# Patient Record
Sex: FEMALE | Race: OTHER | Employment: FULL TIME | URBAN - METROPOLITAN AREA
[De-identification: names, ages, dates, MRNs, and addresses within clinical notes are randomized per-mention and may not be internally consistent; named-entity substitution may affect disease eponyms.]

---

## 2018-05-23 PROCEDURE — 81001 URINALYSIS AUTO W/SCOPE: CPT | Performed by: FAMILY MEDICINE

## 2022-05-09 ENCOUNTER — NURSE ONLY (OUTPATIENT)
Dept: INTERNAL MEDICINE CLINIC | Facility: HOSPITAL | Age: 32
End: 2022-05-09
Attending: EMERGENCY MEDICINE

## 2022-05-09 DIAGNOSIS — Z00.00 WELLNESS EXAMINATION: Primary | ICD-10-CM

## 2022-05-09 PROCEDURE — 86480 TB TEST CELL IMMUN MEASURE: CPT

## 2022-05-09 PROCEDURE — 86787 VARICELLA-ZOSTER ANTIBODY: CPT

## 2022-05-10 LAB
M TB IFN-G CD4+ T-CELLS BLD-ACNC: 0.07 IU/ML
M TB TUBERC IFN-G BLD QL: NEGATIVE
M TB TUBERC IGNF/MITOGEN IGNF CONTROL: >10 IU/ML
QFT TB1 AG MINUS NIL: -0.01 IU/ML
QFT TB2 AG MINUS NIL: 0 IU/ML

## 2022-05-11 LAB — VZV IGG SER IA-ACNC: 1390 (ref 165–?)

## 2022-06-18 ENCOUNTER — HOSPITAL ENCOUNTER (OUTPATIENT)
Dept: GENERAL RADIOLOGY | Age: 32
Discharge: HOME | End: 2022-06-18
Attending: PHYSICIAN ASSISTANT
Payer: COMMERCIAL

## 2022-06-18 ENCOUNTER — OFFICE VISIT (OUTPATIENT)
Dept: FAMILY MEDICINE CLINIC | Facility: CLINIC | Age: 32
End: 2022-06-18
Payer: COMMERCIAL

## 2022-06-18 VITALS
HEART RATE: 69 BPM | DIASTOLIC BLOOD PRESSURE: 83 MMHG | WEIGHT: 293 LBS | SYSTOLIC BLOOD PRESSURE: 117 MMHG | HEIGHT: 64 IN | BODY MASS INDEX: 50.02 KG/M2

## 2022-06-18 DIAGNOSIS — M25.9 LESION OF WRIST: ICD-10-CM

## 2022-06-18 DIAGNOSIS — M25.9 LESION OF WRIST: Primary | ICD-10-CM

## 2022-06-18 PROCEDURE — 3008F BODY MASS INDEX DOCD: CPT | Performed by: PHYSICIAN ASSISTANT

## 2022-06-18 PROCEDURE — 3079F DIAST BP 80-89 MM HG: CPT | Performed by: PHYSICIAN ASSISTANT

## 2022-06-18 PROCEDURE — 3074F SYST BP LT 130 MM HG: CPT | Performed by: PHYSICIAN ASSISTANT

## 2022-06-18 PROCEDURE — 99202 OFFICE O/P NEW SF 15 MIN: CPT | Performed by: PHYSICIAN ASSISTANT

## 2022-06-18 PROCEDURE — 73110 X-RAY EXAM OF WRIST: CPT | Performed by: PHYSICIAN ASSISTANT

## 2022-07-13 ENCOUNTER — TELEMEDICINE (OUTPATIENT)
Dept: INTERNAL MEDICINE CLINIC | Facility: CLINIC | Age: 32
End: 2022-07-13

## 2022-07-13 DIAGNOSIS — Z51.81 ENCOUNTER FOR THERAPEUTIC DRUG MONITORING: Primary | ICD-10-CM

## 2022-07-13 DIAGNOSIS — E66.01 CLASS 3 SEVERE OBESITY DUE TO EXCESS CALORIES WITHOUT SERIOUS COMORBIDITY WITH BODY MASS INDEX (BMI) OF 50.0 TO 59.9 IN ADULT (HCC): ICD-10-CM

## 2022-07-13 DIAGNOSIS — F50.89 OTHER DISORDER OF EATING: ICD-10-CM

## 2022-07-13 DIAGNOSIS — D50.9 IRON DEFICIENCY ANEMIA, UNSPECIFIED IRON DEFICIENCY ANEMIA TYPE: ICD-10-CM

## 2022-07-13 PROBLEM — E66.813 CLASS 3 SEVERE OBESITY DUE TO EXCESS CALORIES WITHOUT SERIOUS COMORBIDITY WITH BODY MASS INDEX (BMI) OF 50.0 TO 59.9 IN ADULT: Status: ACTIVE | Noted: 2022-07-13

## 2022-07-13 PROBLEM — E66.813 CLASS 3 SEVERE OBESITY DUE TO EXCESS CALORIES WITHOUT SERIOUS COMORBIDITY WITH BODY MASS INDEX (BMI) OF 50.0 TO 59.9 IN ADULT (HCC): Status: ACTIVE | Noted: 2022-07-13

## 2022-07-13 PROCEDURE — 99204 OFFICE O/P NEW MOD 45 MIN: CPT | Performed by: NURSE PRACTITIONER

## 2022-07-13 RX ORDER — LIRAGLUTIDE 6 MG/ML
3 INJECTION, SOLUTION SUBCUTANEOUS DAILY
Qty: 15 ML | Refills: 1 | Status: SHIPPED | OUTPATIENT
Start: 2022-07-13

## 2022-07-13 RX ORDER — PEN NEEDLE, DIABETIC 30 GX3/16"
1 NEEDLE, DISPOSABLE MISCELLANEOUS DAILY
Qty: 100 EACH | Refills: 1 | Status: SHIPPED | OUTPATIENT
Start: 2022-07-13

## 2022-07-21 ENCOUNTER — LAB ENCOUNTER (OUTPATIENT)
Dept: LAB | Facility: HOSPITAL | Age: 32
End: 2022-07-21
Attending: NURSE PRACTITIONER
Payer: COMMERCIAL

## 2022-07-21 DIAGNOSIS — F50.89 OTHER DISORDER OF EATING: ICD-10-CM

## 2022-07-21 DIAGNOSIS — Z51.81 ENCOUNTER FOR THERAPEUTIC DRUG MONITORING: ICD-10-CM

## 2022-07-21 DIAGNOSIS — E66.01 CLASS 3 SEVERE OBESITY DUE TO EXCESS CALORIES WITHOUT SERIOUS COMORBIDITY WITH BODY MASS INDEX (BMI) OF 50.0 TO 59.9 IN ADULT (HCC): ICD-10-CM

## 2022-07-21 DIAGNOSIS — D50.9 IRON DEFICIENCY ANEMIA, UNSPECIFIED IRON DEFICIENCY ANEMIA TYPE: ICD-10-CM

## 2022-07-21 LAB
EST. AVERAGE GLUCOSE BLD GHB EST-MCNC: 117 MG/DL (ref 68–126)
HBA1C MFR BLD: 5.7 % (ref ?–5.7)
VIT B12 SERPL-MCNC: 281 PG/ML (ref 193–986)
VIT D+METAB SERPL-MCNC: 14.9 NG/ML (ref 30–100)

## 2022-07-21 PROCEDURE — 83036 HEMOGLOBIN GLYCOSYLATED A1C: CPT

## 2022-07-21 PROCEDURE — 82306 VITAMIN D 25 HYDROXY: CPT

## 2022-07-21 PROCEDURE — 82607 VITAMIN B-12: CPT

## 2022-07-21 PROCEDURE — 36415 COLL VENOUS BLD VENIPUNCTURE: CPT

## 2022-08-26 ENCOUNTER — HOSPITAL ENCOUNTER (OUTPATIENT)
Dept: ULTRASOUND IMAGING | Facility: HOSPITAL | Age: 32
Discharge: HOME | End: 2022-08-26
Attending: PHYSICIAN ASSISTANT
Payer: COMMERCIAL

## 2022-08-26 DIAGNOSIS — M25.9 LESION OF WRIST: ICD-10-CM

## 2022-08-26 PROCEDURE — 76882 US LMTD JT/FCL EVL NVASC XTR: CPT | Performed by: PHYSICIAN ASSISTANT

## 2022-08-28 ENCOUNTER — LAB ENCOUNTER (OUTPATIENT)
Dept: LAB | Facility: HOSPITAL | Age: 32
End: 2022-08-28
Attending: PHYSICIAN ASSISTANT
Payer: COMMERCIAL

## 2022-08-28 DIAGNOSIS — Z00.00 ROUTINE GENERAL MEDICAL EXAMINATION AT A HEALTH CARE FACILITY: ICD-10-CM

## 2022-08-28 LAB
ALBUMIN SERPL-MCNC: 3.4 G/DL (ref 3.4–5)
ALBUMIN/GLOB SERPL: 0.9 {RATIO} (ref 1–2)
ALP LIVER SERPL-CCNC: 49 U/L
ALT SERPL-CCNC: 22 U/L
ANION GAP SERPL CALC-SCNC: 5 MMOL/L (ref 0–18)
AST SERPL-CCNC: 16 U/L (ref 15–37)
BASOPHILS # BLD AUTO: 0.05 X10(3) UL (ref 0–0.2)
BASOPHILS NFR BLD AUTO: 0.9 %
BILIRUB SERPL-MCNC: 0.5 MG/DL (ref 0.1–2)
BUN BLD-MCNC: 12 MG/DL (ref 7–18)
CALCIUM BLD-MCNC: 8.4 MG/DL (ref 8.5–10.1)
CHLORIDE SERPL-SCNC: 111 MMOL/L (ref 98–112)
CHOLEST SERPL-MCNC: 184 MG/DL (ref ?–200)
CO2 SERPL-SCNC: 23 MMOL/L (ref 21–32)
CREAT BLD-MCNC: 0.7 MG/DL
EOSINOPHIL # BLD AUTO: 0.16 X10(3) UL (ref 0–0.7)
EOSINOPHIL NFR BLD AUTO: 2.8 %
ERYTHROCYTE [DISTWIDTH] IN BLOOD BY AUTOMATED COUNT: 13.3 %
FASTING PATIENT LIPID ANSWER: YES
FASTING STATUS PATIENT QL REPORTED: YES
GFR SERPLBLD BASED ON 1.73 SQ M-ARVRAT: 119 ML/MIN/1.73M2 (ref 60–?)
GLOBULIN PLAS-MCNC: 3.7 G/DL (ref 2.8–4.4)
GLUCOSE BLD-MCNC: 94 MG/DL (ref 70–99)
HCT VFR BLD AUTO: 39.3 %
HDLC SERPL-MCNC: 53 MG/DL (ref 40–59)
HGB BLD-MCNC: 12.8 G/DL
IMM GRANULOCYTES # BLD AUTO: 0.01 X10(3) UL (ref 0–1)
IMM GRANULOCYTES NFR BLD: 0.2 %
LDLC SERPL CALC-MCNC: 117 MG/DL (ref ?–100)
LYMPHOCYTES # BLD AUTO: 1.96 X10(3) UL (ref 1–4)
LYMPHOCYTES NFR BLD AUTO: 34.3 %
MCH RBC QN AUTO: 29.7 PG (ref 26–34)
MCHC RBC AUTO-ENTMCNC: 32.6 G/DL (ref 31–37)
MCV RBC AUTO: 91.2 FL
MONOCYTES # BLD AUTO: 0.39 X10(3) UL (ref 0.1–1)
MONOCYTES NFR BLD AUTO: 6.8 %
NEUTROPHILS # BLD AUTO: 3.14 X10 (3) UL (ref 1.5–7.7)
NEUTROPHILS # BLD AUTO: 3.14 X10(3) UL (ref 1.5–7.7)
NEUTROPHILS NFR BLD AUTO: 55 %
NONHDLC SERPL-MCNC: 131 MG/DL (ref ?–130)
OSMOLALITY SERPL CALC.SUM OF ELEC: 288 MOSM/KG (ref 275–295)
PLATELET # BLD AUTO: 230 10(3)UL (ref 150–450)
POTASSIUM SERPL-SCNC: 4 MMOL/L (ref 3.5–5.1)
PROT SERPL-MCNC: 7.1 G/DL (ref 6.4–8.2)
RBC # BLD AUTO: 4.31 X10(6)UL
SODIUM SERPL-SCNC: 139 MMOL/L (ref 136–145)
TRIGL SERPL-MCNC: 75 MG/DL (ref 30–149)
TSI SER-ACNC: 1.09 MIU/ML (ref 0.36–3.74)
VLDLC SERPL CALC-MCNC: 13 MG/DL (ref 0–30)
WBC # BLD AUTO: 5.7 X10(3) UL (ref 4–11)

## 2022-08-28 PROCEDURE — 80061 LIPID PANEL: CPT

## 2022-08-28 PROCEDURE — 36415 COLL VENOUS BLD VENIPUNCTURE: CPT

## 2022-08-28 PROCEDURE — 85025 COMPLETE CBC W/AUTO DIFF WBC: CPT

## 2022-08-28 PROCEDURE — 84443 ASSAY THYROID STIM HORMONE: CPT

## 2022-08-28 PROCEDURE — 80053 COMPREHEN METABOLIC PANEL: CPT

## 2022-09-02 ENCOUNTER — OFFICE VISIT (OUTPATIENT)
Dept: FAMILY MEDICINE CLINIC | Facility: CLINIC | Age: 32
End: 2022-09-02
Payer: COMMERCIAL

## 2022-09-02 VITALS
WEIGHT: 287 LBS | DIASTOLIC BLOOD PRESSURE: 79 MMHG | HEIGHT: 64 IN | HEART RATE: 89 BPM | SYSTOLIC BLOOD PRESSURE: 121 MMHG | BODY MASS INDEX: 49 KG/M2

## 2022-09-02 DIAGNOSIS — Z00.00 ROUTINE GENERAL MEDICAL EXAMINATION AT A HEALTH CARE FACILITY: Primary | ICD-10-CM

## 2022-09-02 PROCEDURE — 3008F BODY MASS INDEX DOCD: CPT | Performed by: PHYSICIAN ASSISTANT

## 2022-09-02 PROCEDURE — 90471 IMMUNIZATION ADMIN: CPT | Performed by: PHYSICIAN ASSISTANT

## 2022-09-02 PROCEDURE — 3078F DIAST BP <80 MM HG: CPT | Performed by: PHYSICIAN ASSISTANT

## 2022-09-02 PROCEDURE — 90715 TDAP VACCINE 7 YRS/> IM: CPT | Performed by: PHYSICIAN ASSISTANT

## 2022-09-02 PROCEDURE — 3074F SYST BP LT 130 MM HG: CPT | Performed by: PHYSICIAN ASSISTANT

## 2022-09-02 PROCEDURE — 99395 PREV VISIT EST AGE 18-39: CPT | Performed by: PHYSICIAN ASSISTANT

## 2022-09-15 ENCOUNTER — OFFICE VISIT (OUTPATIENT)
Dept: INTERNAL MEDICINE CLINIC | Facility: CLINIC | Age: 32
End: 2022-09-15
Payer: COMMERCIAL

## 2022-09-15 VITALS
DIASTOLIC BLOOD PRESSURE: 80 MMHG | RESPIRATION RATE: 16 BRPM | HEART RATE: 78 BPM | BODY MASS INDEX: 48.4 KG/M2 | WEIGHT: 287 LBS | HEIGHT: 64.5 IN | SYSTOLIC BLOOD PRESSURE: 120 MMHG

## 2022-09-15 VITALS — WEIGHT: 289 LBS | BODY MASS INDEX: 49.34 KG/M2 | HEIGHT: 64 IN

## 2022-09-15 DIAGNOSIS — E55.9 VITAMIN D DEFICIENCY: ICD-10-CM

## 2022-09-15 DIAGNOSIS — E53.8 LOW VITAMIN B12 LEVEL: ICD-10-CM

## 2022-09-15 DIAGNOSIS — D50.9 IRON DEFICIENCY ANEMIA, UNSPECIFIED IRON DEFICIENCY ANEMIA TYPE: ICD-10-CM

## 2022-09-15 DIAGNOSIS — Z51.81 ENCOUNTER FOR THERAPEUTIC DRUG MONITORING: Primary | ICD-10-CM

## 2022-09-15 DIAGNOSIS — F50.89 OTHER DISORDER OF EATING: ICD-10-CM

## 2022-09-15 DIAGNOSIS — R73.03 PREDIABETES: ICD-10-CM

## 2022-09-15 DIAGNOSIS — E66.01 CLASS 3 SEVERE OBESITY DUE TO EXCESS CALORIES WITHOUT SERIOUS COMORBIDITY WITH BODY MASS INDEX (BMI) OF 50.0 TO 59.9 IN ADULT (HCC): ICD-10-CM

## 2022-09-15 PROCEDURE — 3008F BODY MASS INDEX DOCD: CPT

## 2022-09-15 PROCEDURE — 97802 MEDICAL NUTRITION INDIV IN: CPT

## 2022-09-15 RX ORDER — SEMAGLUTIDE 1.7 MG/.75ML
1.7 INJECTION, SOLUTION SUBCUTANEOUS WEEKLY
Qty: 3 ML | Refills: 0 | Status: SHIPPED | OUTPATIENT
Start: 2022-09-15 | End: 2022-10-07

## 2022-09-18 PROBLEM — R73.03 PREDIABETES: Status: ACTIVE | Noted: 2022-09-18

## 2022-10-13 ENCOUNTER — IMMUNIZATION (OUTPATIENT)
Dept: LAB | Facility: HOSPITAL | Age: 32
End: 2022-10-13
Attending: PREVENTIVE MEDICINE
Payer: COMMERCIAL

## 2022-10-13 DIAGNOSIS — Z23 NEED FOR VACCINATION: Primary | ICD-10-CM

## 2022-10-13 PROCEDURE — 90471 IMMUNIZATION ADMIN: CPT

## 2022-10-19 DIAGNOSIS — E66.01 CLASS 3 SEVERE OBESITY DUE TO EXCESS CALORIES WITHOUT SERIOUS COMORBIDITY WITH BODY MASS INDEX (BMI) OF 50.0 TO 59.9 IN ADULT (HCC): ICD-10-CM

## 2022-10-19 DIAGNOSIS — R73.03 PREDIABETES: ICD-10-CM

## 2022-10-19 DIAGNOSIS — Z51.81 ENCOUNTER FOR THERAPEUTIC DRUG MONITORING: ICD-10-CM

## 2022-10-19 RX ORDER — SEMAGLUTIDE 1.7 MG/.75ML
INJECTION, SOLUTION SUBCUTANEOUS
Qty: 3 ML | Refills: 0 | OUTPATIENT
Start: 2022-10-19

## 2022-10-19 NOTE — TELEPHONE ENCOUNTER
Requesting Wegovy  LOV: 9/15/22  RTC: 2 months  Last Relevant Labs: na  Filled: 9/15/22 #3ml with 0 refills    Future Appointments   Date Time Provider Rebeka Neff   11/10/2022 11:00 AM [facility]   12/9/2022 10:00 AM Grayson Dc RD [facility]

## 2022-12-09 ENCOUNTER — OFFICE VISIT (OUTPATIENT)
Dept: INTERNAL MEDICINE CLINIC | Facility: CLINIC | Age: 32
End: 2022-12-09
Payer: COMMERCIAL

## 2022-12-09 ENCOUNTER — TELEMEDICINE (OUTPATIENT)
Dept: INTERNAL MEDICINE CLINIC | Facility: CLINIC | Age: 32
End: 2022-12-09

## 2022-12-09 VITALS — HEIGHT: 64.5 IN | BODY MASS INDEX: 45.6 KG/M2 | WEIGHT: 270.38 LBS

## 2022-12-09 DIAGNOSIS — E66.01 CLASS 3 SEVERE OBESITY DUE TO EXCESS CALORIES WITHOUT SERIOUS COMORBIDITY WITH BODY MASS INDEX (BMI) OF 50.0 TO 59.9 IN ADULT (HCC): ICD-10-CM

## 2022-12-09 DIAGNOSIS — E53.8 LOW VITAMIN B12 LEVEL: ICD-10-CM

## 2022-12-09 DIAGNOSIS — R73.03 PREDIABETES: ICD-10-CM

## 2022-12-09 DIAGNOSIS — Z51.81 ENCOUNTER FOR THERAPEUTIC DRUG MONITORING: Primary | ICD-10-CM

## 2022-12-09 DIAGNOSIS — E55.9 VITAMIN D DEFICIENCY: ICD-10-CM

## 2022-12-09 PROCEDURE — 97803 MED NUTRITION INDIV SUBSEQ: CPT

## 2022-12-09 PROCEDURE — 3008F BODY MASS INDEX DOCD: CPT

## 2022-12-09 PROCEDURE — 99213 OFFICE O/P EST LOW 20 MIN: CPT | Performed by: NURSE PRACTITIONER

## 2022-12-09 NOTE — PATIENT INSTRUCTIONS
Goals:  Continue to keep a food record, My Net Diary, select macros dashboard. Continue to consume 4-6 meals/snacks per day. Include protein and produce. Aim for 60-80 grams per day of protein. Try to keep the carbohydrates at 120 grams per day or less. (Combine protein when eating sweets.)  Continue to drink 64 oz per day. Continue to exercise daily. Add strength training 10 minutes three days per week.   (Try ankle or wrist weights when walking or biking with dog every other day) or (7 minute workout) 125

## 2022-12-09 NOTE — PATIENT INSTRUCTIONS
Next steps:  1. Fill your prescribed medication and take as discussed and prescribed: wegovy 2.4mg  2. Schedule a personal nutrition consultation with one of our registered dieticians     Please try to work on the following dietary changes:  Daily protein recommendation to start: 100  grams  Daily carbohydrate: <140g  Daily calories: 1,700  1. Drink water with meals and throughout the day, cut down on soda and/or juice if consumed. Consider flavored water options like Bubbly, Spindrift, Hint and Shante. 2.  Eat breakfast daily and focus on having protein with each meal, examples include: greek yogurt, cottage cheese, hard boiled egg, whole grain toast with peanut butter. 3.  Reduce refined carbohydrates and sugars which includes items such as sweets, as well as rice, pasta, and bread and make sure to choose whole grain options when having them with just 1 serving per meal about the size of your inner palm. 4.  Consume non starchy veggies daily working towards making them a good 50% of your daily food intake. Add them to lunch and dinner consistently. 5.  Start a daily probiotic: VSL#3 is recommended, (order on line at www.vsl3. com). Take 1 capsule daily with water for 30 days, then reduce to 1 every other day (this will reduce the cost). Capsules can be left out for 2 weeks, but then must be refrigerated. Please download meg My Fitness Brandy Miles! Or Net Diary to monitor daily dietary intake and you will be able to see if you are eating the right amount of calories or too much or too little which would hinder weight loss. Additionally this will help to see your daily carbohydrate and protein intake. When you set the meg up choose 1-2 lbs/week as a goal.  Keeping a paper food journal is an option as well to remain accountable for your choices- this is the start to mindful eating! A low calorie diet has been consistently shown to support weight loss.      Continue or start exercising to help establish a routine. If not already exercising begin with 1 day and progress as able with long-term goal of 30 minutes 5 days a week at a minimum. Meditation daily can help manage and control stress. Chronic stress can make weight loss difficult. Exercising is one way to help with stress, but meditation using the CALM Elder or another comparable alternative can be done in your home or place of work with little time commitment. This Elder can also help work on behavior change and improve sleep. Check out the segment under Calm Masterclass and listen to The 4 Pillars of Health. A great way to begin learning about the foundation of lifestyle with practical tips to use in your every day. Check out www.yourweightmatters. org blog for continued daily support and education along this weight loss journey! Patient Resources:     Personal Training/Fitness Classes/Health Coaching     Bautista Cordero and Zenon Brockasia @ [email or web address]/ Full fitness center with group fitness and personal training. Discount available as client of [facility]. Health Coaching and Personal Training with Kesha Ma at our [facility]- individual weekly coaching with option to add personal training and small group fitness classes targeted at weight loss- [phone] and/or email @ Laureano [email or web address]. [email or web address]  360FIT Simón [email or web address] Group Fitness [phone] and/or email Home Escalera at [email or web address]  [address] [facility] with multiple locations: Aetna (www.PresenterNet), Eat The Frog Fitness (www.Rentmetrics. Shutl), Fit Body Bootcamp (www.Adcole Corporationbodybootcamp.Shutl), Zumper Fitness (www.Encore HQ. [place]l), The Exercise  (www.exercisecoach.Shutl)     Online Fitness  Fitness  on Whole Foods in 10 DVD series- www. kbczs16JFC. Shutl  Sit and Be Fit - Chair exercise series Www.sitandbefit. org  Hip Hop Fit with Sascha Bowie at [place]     Apps for on the Fuzz 7 Minute Workout (orange box with white 7) - free on the go HIIT training elder  Peloton Elder @ wwwSqeeqee     Nutrition Trackers and Tools  LoseIT! And My Fitness Pal apps and on line for tracking nutrition  NOOM - virtual health coaching  FitFoundation (healthy meals on the go) in [facility] @ www. nkikyjsxtvzwd8e. Pérez Dalton MD @ www.bistromd.com and Tamir Maria C (keto and low carb plans recommended) @ www. OHTKBL70.NUV, Metabolic Meals @ www. MyMetabolicMeals. com - individual prepared meals to go  Pixer Technology, WikiYou, International Business Machines, Every Plate, [facility]- on line meal delivery programs for preparation at home  [employer] in [facility] for homemade meals to go @ www.mealSNAPCARD  Diet Doctor @ www. dietdoctor. com - low carb swaps  Yummly - meal prep and planning elder (www.yummly. com)     Stress Management/Behavior/Mindful Eating  CALM meditation elder (www.BidKind)  Headspace  Am I Hungry? Mindful eating virtual  elder  Www.yourweightmatters. org - Obesity Action Coalition sponsored Blog posts daily  Motivation elder (black box with white \")- daily supportive messages sent to your phone     Books/Video Education/Podcasts  Mindless Eating by Ramy Jauregui  Why We Get Sick by Zahra Rogers (a book about insulin resistance)  Atomic Habits by Renetta Tomlinson (a book about taking small steps to promote greater behavior change)   Can't Hurt Me by Dina Doctor (a book exploring the power of discipline in achieving your goals)  The End of Dieting: How to Live for Life by Dr. Cathleen Hutchison M.D. or listen to The [address] Episode 61: Understanding \"Nutritarian\" Eating w/Dr. Cathleen Hutchison  Your Body in Balance: The World [employer] Services Corporation of Food, Hormones, and Health by Dr. Doroteo Morales  The Menopause Diet Plan by [facility] HOSP AT [facility]  The Complete Guide to fasting by Dr. Tyler Wright, [address] [facility] by Khurram Esposito, Ph.D, R.D.   Weight Loss Surgery Will Not Treat Food Addiction by Mayo Caballero Ph.D  The [address] [place] [address] on plant based nutrition  Fed Up - documentary about obesity (Free on [place])  The Truth About Sugar - documentary on sugar (Free on Utube, https://youConvou. be/0S0mwznPR7k)  The Dr. Kaye Dust by Dr. Shasta Dubon MD  Fitlosophy Fitspiration - journal to better health (found at [employer] in fitness aisle)  What Happened to You?- a look at the impact trauma has on behavior written by Jacqueline Dominguez and Dr. Fozia Frazier Again by Katia Medina - discovering your true self after trauma  Stephanie Lara talk on Offline Media, The Call to Courage  Podcasts: The Exam Room by the Physician's Committee, Nutrition Facts by Dr. Danyell Pérez    We are here to support you with weight loss, but please remember that you still need your primary care provider for your routine health maintenance.

## 2022-12-26 RX ORDER — SEMAGLUTIDE 2.4 MG/.75ML
2.4 INJECTION, SOLUTION SUBCUTANEOUS WEEKLY
Qty: 3 ML | Refills: 0 | Status: SHIPPED | OUTPATIENT
Start: 2022-12-26

## 2023-01-23 ENCOUNTER — TELEPHONE (OUTPATIENT)
Dept: INTERNAL MEDICINE CLINIC | Facility: CLINIC | Age: 33
End: 2023-01-23

## 2023-01-23 NOTE — TELEPHONE ENCOUNTER
Pt id [id number]  Pa# prime     9/15/22 STARTING WEIGHT 287LB  CURRENT- 264.4LB (1/23/23)    Class 3 severe obesity due to excess calories without serious comorbidity with body mass index (BMI) of 50.0 to 59.9 in adult (HCC)  E66.01, Z68.43    Prediabetes  R73.03    Vitamin D deficiency  E55.9    Low vitamin B12 level  E53.8

## 2023-01-30 NOTE — TELEPHONE ENCOUNTER
pRIME notified via fax that wegovy doesn't require Prior authorization at this time, however pt has exceeded a 30 days retail refill restriction, so  plan wants pt to call her  insurance and see what other pharmacy or mail order pharmacy she  can use in order to fill  [facility] 2.4mg, then she will notify us.

## 2023-01-31 RX ORDER — SEMAGLUTIDE 2.4 MG/.75ML
2.4 INJECTION, SOLUTION SUBCUTANEOUS WEEKLY
Qty: 9 ML | Refills: 0 | Status: SHIPPED | OUTPATIENT
Start: 2023-01-31

## 2023-02-18 ENCOUNTER — OFFICE VISIT (OUTPATIENT)
Dept: FAMILY MEDICINE CLINIC | Facility: CLINIC | Age: 33
End: 2023-02-18
Payer: COMMERCIAL

## 2023-02-18 VITALS
DIASTOLIC BLOOD PRESSURE: 84 MMHG | TEMPERATURE: 98 F | OXYGEN SATURATION: 98 % | RESPIRATION RATE: 16 BRPM | HEART RATE: 77 BPM | SYSTOLIC BLOOD PRESSURE: 110 MMHG | BODY MASS INDEX: 44 KG/M2 | WEIGHT: 262 LBS

## 2023-02-18 DIAGNOSIS — S89.91XA INJURY OF RIGHT KNEE, INITIAL ENCOUNTER: Primary | ICD-10-CM

## 2023-02-18 RX ORDER — MULTIVIT-MIN/IRON/FOLIC ACID/K 18-600-40
CAPSULE ORAL
COMMUNITY

## 2023-02-18 RX ORDER — UBIDECARENONE 75 MG
CAPSULE ORAL DAILY
COMMUNITY

## 2023-02-18 NOTE — PATIENT INSTRUCTIONS
Continue with knee bracing and ice to reduce swelling and inflammation. Use otc nsaids for pain and swelling. Follow-up with Ortho to evaluate for possible soft tissue tear.

## 2023-02-20 ENCOUNTER — TELEPHONE (OUTPATIENT)
Dept: ORTHOPEDICS CLINIC | Facility: CLINIC | Age: 33
End: 2023-02-20

## 2023-02-20 DIAGNOSIS — M25.561 RIGHT KNEE PAIN, UNSPECIFIED CHRONICITY: Primary | ICD-10-CM

## 2023-02-20 NOTE — TELEPHONE ENCOUNTER
Patient is scheduled with Dr. Jennifer Contreras for right knee pain. Please advise if imaging is needed.

## 2023-03-03 ENCOUNTER — OFFICE VISIT (OUTPATIENT)
Dept: INTERNAL MEDICINE CLINIC | Facility: CLINIC | Age: 33
End: 2023-03-03
Payer: COMMERCIAL

## 2023-03-03 VITALS — HEIGHT: 64.5 IN | WEIGHT: 258.38 LBS | BODY MASS INDEX: 43.58 KG/M2

## 2023-03-03 DIAGNOSIS — R73.03 PREDIABETES: ICD-10-CM

## 2023-03-03 DIAGNOSIS — E66.01 CLASS 3 SEVERE OBESITY DUE TO EXCESS CALORIES WITHOUT SERIOUS COMORBIDITY WITH BODY MASS INDEX (BMI) OF 50.0 TO 59.9 IN ADULT (HCC): ICD-10-CM

## 2023-03-03 NOTE — PATIENT INSTRUCTIONS
Goals:  Continue to keep a food record, My Net Diary, select macros dashboard. Continue to consume 4-6 meals/snacks per day. Include protein and produce. Aim for 60-80 grams per day of protein. Try to keep the carbohydrates at 120 grams per day or less. (Combine protein when eating sweets.) (Try Flex or Quest chips with hummus or bean dip). Increase fruit and vegetable to 3-4 per day. Continue to drink 64 oz per day. Continue to exercise daily. Add strength training 10 minutes three days per week.   (Try ankle or wrist weights when walking or biking with dog every other day) or (7 minute workout) Try upper body strength exercises (Sit and Be Fit)

## 2023-03-08 ENCOUNTER — OFFICE VISIT (OUTPATIENT)
Dept: INTERNAL MEDICINE CLINIC | Facility: CLINIC | Age: 33
End: 2023-03-08
Payer: COMMERCIAL

## 2023-03-08 VITALS
DIASTOLIC BLOOD PRESSURE: 88 MMHG | HEART RATE: 82 BPM | WEIGHT: 257 LBS | OXYGEN SATURATION: 99 % | BODY MASS INDEX: 43.34 KG/M2 | SYSTOLIC BLOOD PRESSURE: 128 MMHG | HEIGHT: 64.5 IN | RESPIRATION RATE: 18 BRPM

## 2023-03-08 DIAGNOSIS — Z51.81 ENCOUNTER FOR THERAPEUTIC DRUG MONITORING: Primary | ICD-10-CM

## 2023-03-08 DIAGNOSIS — E53.8 VITAMIN B12 DEFICIENCY: ICD-10-CM

## 2023-03-08 DIAGNOSIS — E55.9 VITAMIN D DEFICIENCY: ICD-10-CM

## 2023-03-08 DIAGNOSIS — E66.01 CLASS 3 SEVERE OBESITY DUE TO EXCESS CALORIES WITHOUT SERIOUS COMORBIDITY WITH BODY MASS INDEX (BMI) OF 50.0 TO 59.9 IN ADULT (HCC): ICD-10-CM

## 2023-03-08 DIAGNOSIS — R73.03 PREDIABETES: ICD-10-CM

## 2023-03-08 PROCEDURE — 3008F BODY MASS INDEX DOCD: CPT | Performed by: NURSE PRACTITIONER

## 2023-03-08 PROCEDURE — 3079F DIAST BP 80-89 MM HG: CPT | Performed by: NURSE PRACTITIONER

## 2023-03-08 PROCEDURE — 99213 OFFICE O/P EST LOW 20 MIN: CPT | Performed by: NURSE PRACTITIONER

## 2023-03-08 PROCEDURE — 3074F SYST BP LT 130 MM HG: CPT | Performed by: NURSE PRACTITIONER

## 2023-03-08 RX ORDER — SEMAGLUTIDE 2.4 MG/.75ML
2.4 INJECTION, SOLUTION SUBCUTANEOUS WEEKLY
Qty: 9 ML | Refills: 1 | Status: SHIPPED | OUTPATIENT
Start: 2023-03-08

## 2023-05-26 ENCOUNTER — TELEMEDICINE (OUTPATIENT)
Dept: INTERNAL MEDICINE CLINIC | Facility: CLINIC | Age: 33
End: 2023-05-26

## 2023-05-26 DIAGNOSIS — R73.03 PREDIABETES: ICD-10-CM

## 2023-05-26 DIAGNOSIS — E55.9 VITAMIN D DEFICIENCY: ICD-10-CM

## 2023-05-26 DIAGNOSIS — Z51.81 ENCOUNTER FOR THERAPEUTIC DRUG MONITORING: Primary | ICD-10-CM

## 2023-05-26 DIAGNOSIS — E66.01 CLASS 3 SEVERE OBESITY DUE TO EXCESS CALORIES WITHOUT SERIOUS COMORBIDITY WITH BODY MASS INDEX (BMI) OF 50.0 TO 59.9 IN ADULT (HCC): ICD-10-CM

## 2023-05-26 DIAGNOSIS — E53.8 VITAMIN B12 DEFICIENCY: ICD-10-CM

## 2023-05-26 NOTE — PATIENT INSTRUCTIONS
Next steps:  1. Fill your prescribed medication and take as discussed and prescribed: wegovy 2.4mg weekly (skip next weeks dosage)- send us [place] message on how you are feeling  Add in probiotic daily   2. Schedule a personal nutrition consultation with one of our registered dieticians       1. Drink water with meals and throughout the day, cut down on soda and/or juice if consumed. Consider flavored water options like Bubbly, Spindrift, Hint and Shante. 2.  Eat breakfast daily and focus on having protein with each meal, examples include: greek yogurt, cottage cheese, hard boiled egg, whole grain toast with peanut butter. 3.  Reduce refined carbohydrates and sugars which includes items such as sweets, as well as rice, pasta, and bread and make sure to choose whole grain options when having them with just 1 serving per meal about the size of your inner palm. 4.  Consume non starchy veggies daily working towards making them a good 50% of your daily food intake. Add them to lunch and dinner consistently. 5.  Start a daily probiotic: VSL#3 is recommended, (order on line at www.vsl3. com). Take 1 capsule daily with water for 30 days, then reduce to 1 every other day (this will reduce the cost). Capsules can be left out for 2 weeks, but then must be refrigerated. Please download meg My Fitness Katkoe Cutting! Or Net Diary to monitor daily dietary intake and you will be able to see if you are eating the right amount of calories or too much or too little which would hinder weight loss. Additionally this will help to see your daily carbohydrate and protein intake. When you set the meg up choose 1-2 lbs/week as a goal.  Keeping a paper food journal is an option as well to remain accountable for your choices- this is the start to mindful eating! A low calorie diet has been consistently shown to support weight loss. Continue or start exercising to help establish a routine.  If not already exercising begin with 1 day and progress as able with long-term goal of 30 minutes 5 days a week at a minimum. Meditation daily can help manage and control stress. Chronic stress can make weight loss difficult. Exercising is one way to help with stress, but meditation using the CALM Elder or another comparable alternative can be done in your home or place of work with little time commitment. This Elder can also help work on behavior change and improve sleep. Check out the segment under Calm Masterclass and listen to The 4 Pillars of Health. A great way to begin learning about the foundation of lifestyle with practical tips to use in your every day. Check out www.yourweightmatters. org blog for continued daily support and education along this weight loss journey! Patient Resources:     Personal Training/Fitness Classes/Health Coaching     [facility] YOAV and [place] @ [email or web address]://[email or web address] [email or web address]/ Full fitness center with group fitness and personal training. Discount available as client of [facility]. Health Coaching and Personal Training with Darshan Carvalho at our [facility]- individual weekly coaching with option to add personal training and small group fitness classes targeted at weight loss- [phone] and/or email @ [email or web address] [email or web address]  360FIT Guin [email or web address] Group Fitness [phone] and/or email Jenny Weems at [email or web address]  [address] [facility] with multiple locations: Aetna (www.Storific. Chromasun), Eat The A & A Custom Cornhole Fitness (www.Go-Page Digital Media. Chromasun), Fit Body Bootcamp (www.Publimindbodybootcamp.Chromasun), CARD.com Fitness (www.Kamelio. Chromasun), The Exercise  (www.exercisecoach.Chromasun)     Online Fitness  Fitness  on Whole Foods in 10 DVD series- www. xhxkc05PKE. Chromasun  Sit and Be Fit - Chair exercise series Www.sitandbefit. org  Hip Hop Fit with Sascha Bowie at www.hiphopfit. net     Apps for on the Go Fitness  PreDx Corp 7 Minute Workout (orange box with white 7) - free on the go HIIT training elder  Peloton Elder @ www. Chinacars     Nutrition Trackers and Tools  LoseIT! And My Fitness Pal apps and on line for tracking nutrition  NOOM - virtual health coaching  FitFoundation (healthy meals on the go) in [facility] @ www. rhvdvyetepvjx5m. Reinier Cruz MD @ www.Aislelabstromd.com and Jaren Ham (keto and low carb plans recommended) @ www. LNRBPP58.MGF, Metabolic Meals @ www. MyMetabolicMeals. com - individual prepared meals to go  [place], [facility], Every [facility], Tapiture- on line meal delivery programs for preparation at home  [employer] in [place] for homemade meals to go @ www.mealThinkEco. [place]  Diet Doctor @ www. dietdoctor. com - low carb swaps  Videolicious - meal prep and planning elder (www.yummly. com)     Stress Management/Behavior/Mindful Eating  CALM meditation elder (www.Clip Interactive)  Headspace  Am I Hungry? Mindful eating virtual  elder  Www.yourweightmatters. org - Obesity Action Coalition sponsored Blog posts daily  Motivation elder (black box with white \")- daily supportive messages sent to your phone     Books/Video Education/Podcasts  Mindless Eating by Carmen Lin  Why We Get Sick by Andry Martin (a book about insulin resistance)  Atomic Habits by Pretty Peterson (a book about taking small steps to promote greater behavior change)   Can't Hurt Me by Abdifatah Schroeder (a book exploring the power of discipline in achieving your goals)  The End of Dieting: How to Live for Life by Dr. Farrukh Guillen M.D. or listen to The [address] Episode 61: Understanding \"Nutritarian\" Eating w/Dr. Farrukh Guillen  Your Body in Balance: The World [employer] Services Corporation of Food, Hormones, and Health by Dr. Pricilla Perez  The Menopause Diet Plan by Dax Bolton and [facility] HOSP AT [facility]  The Complete Guide to fasting by Dr. Mariam Pierce, [address] [facility] by Yari Savage, Ph.D, R.D.   Weight Loss Surgery Will Not Treat Food Addiction by Timmy Parker Ph.D  The Rosey Hernadez on plant based nutrition  Fed Up - documentary about obesity (Free on [place])  The Truth About Sugar - documentary on sugar (Free on Utube, https://youtu. be/2Z9emxmCG7y)  The Dr. Lucas Brar by Dr. Lito Murillo MD  Fitlosophy Fitspiration - journal to better health (found at [employer] in fitness aisle)  What Happened to You?- a look at the impact trauma has on behavior written by Nicol Duque and Dr. Wu Staff Again by Morena Guidry - discovering your true self after trauma  Devin Martinez talk on CivilisedMoney, The Call to Courage  Podcasts: The Exam Room by the Physician's Committee, Nutrition Facts by Dr. Claudia Zhong    We are here to support you with weight loss, but please remember that you still need your primary care provider for your routine health maintenance.

## 2023-06-03 ENCOUNTER — PATIENT MESSAGE (OUTPATIENT)
Dept: INTERNAL MEDICINE CLINIC | Facility: CLINIC | Age: 33
End: 2023-06-03

## 2023-06-08 ENCOUNTER — TELEPHONE (OUTPATIENT)
Dept: INTERNAL MEDICINE CLINIC | Facility: CLINIC | Age: 33
End: 2023-06-08

## 2023-06-08 DIAGNOSIS — E66.01 CLASS 3 SEVERE OBESITY DUE TO EXCESS CALORIES WITHOUT SERIOUS COMORBIDITY WITH BODY MASS INDEX (BMI) OF 50.0 TO 59.9 IN ADULT (HCC): Primary | ICD-10-CM

## 2023-06-08 DIAGNOSIS — R73.03 PREDIABETES: ICD-10-CM

## 2023-06-08 NOTE — TELEPHONE ENCOUNTER
Fabiola,     Can you enter a Dietitian referral for patient as she is seeing Adonis Kumar tomorrow.      Reminder to include any cardiovascular risk factor dx in addition to obesity dx      Done - no CV risk

## 2023-06-09 ENCOUNTER — OFFICE VISIT (OUTPATIENT)
Dept: INTERNAL MEDICINE CLINIC | Facility: CLINIC | Age: 33
End: 2023-06-09
Payer: COMMERCIAL

## 2023-06-09 VITALS — BODY MASS INDEX: 42.56 KG/M2 | HEIGHT: 64.5 IN | WEIGHT: 252.38 LBS

## 2023-06-09 DIAGNOSIS — E66.01 CLASS 3 SEVERE OBESITY DUE TO EXCESS CALORIES WITHOUT SERIOUS COMORBIDITY WITH BODY MASS INDEX (BMI) OF 50.0 TO 59.9 IN ADULT (HCC): ICD-10-CM

## 2023-06-09 DIAGNOSIS — R73.03 PREDIABETES: ICD-10-CM

## 2023-06-09 PROCEDURE — 97803 MED NUTRITION INDIV SUBSEQ: CPT

## 2023-06-09 PROCEDURE — 3008F BODY MASS INDEX DOCD: CPT

## 2023-06-09 NOTE — PATIENT INSTRUCTIONS
Goals:  Continue to keep a food record, My Net Diary, select macros dashboard. Continue to consume 4-6 meals/snacks per day. Include protein and produce. Aim for 60-80 grams per day of protein. Try to keep the carbohydrates at 120 grams per day or less. (Combine protein when eating sweets.) (Try Flex or Quest chips or Moon cheese with hummus or bean dip). Increase fruit and vegetable to 3-4 per day, It's as easy as 1-2-3 1 for breakfast, 2 for lunch and 3 for dinner. Continue to drink 64 oz per day. Continue to exercise daily. Continue strength training 10 minutes three days per week. Bands and ankle weights.  or (7 minute workout) Try upper body strength exercises (Sit and Be Fit)

## 2023-06-27 ENCOUNTER — LAB ENCOUNTER (OUTPATIENT)
Dept: LAB | Facility: HOSPITAL | Age: 33
End: 2023-06-27
Attending: NURSE PRACTITIONER
Payer: COMMERCIAL

## 2023-06-27 DIAGNOSIS — E66.01 CLASS 3 SEVERE OBESITY DUE TO EXCESS CALORIES WITHOUT SERIOUS COMORBIDITY WITH BODY MASS INDEX (BMI) OF 50.0 TO 59.9 IN ADULT (HCC): ICD-10-CM

## 2023-06-27 DIAGNOSIS — E55.9 VITAMIN D DEFICIENCY: ICD-10-CM

## 2023-06-27 DIAGNOSIS — Z51.81 ENCOUNTER FOR THERAPEUTIC DRUG MONITORING: ICD-10-CM

## 2023-06-27 DIAGNOSIS — E53.8 VITAMIN B12 DEFICIENCY: ICD-10-CM

## 2023-06-27 DIAGNOSIS — R73.03 PREDIABETES: ICD-10-CM

## 2023-06-27 LAB
EST. AVERAGE GLUCOSE BLD GHB EST-MCNC: 108 MG/DL (ref 68–126)
HBA1C MFR BLD: 5.4 % (ref ?–5.7)
VIT B12 SERPL-MCNC: 785 PG/ML (ref 193–986)

## 2023-06-27 PROCEDURE — 36415 COLL VENOUS BLD VENIPUNCTURE: CPT

## 2023-06-27 PROCEDURE — 82306 VITAMIN D 25 HYDROXY: CPT

## 2023-06-27 PROCEDURE — 82607 VITAMIN B-12: CPT

## 2023-06-27 PROCEDURE — 83036 HEMOGLOBIN GLYCOSYLATED A1C: CPT

## 2023-06-28 ENCOUNTER — TELEMEDICINE (OUTPATIENT)
Dept: INTERNAL MEDICINE CLINIC | Facility: CLINIC | Age: 33
End: 2023-06-28

## 2023-06-28 DIAGNOSIS — R73.03 PREDIABETES: ICD-10-CM

## 2023-06-28 DIAGNOSIS — Z51.81 ENCOUNTER FOR THERAPEUTIC DRUG MONITORING: Primary | ICD-10-CM

## 2023-06-28 DIAGNOSIS — E66.01 CLASS 3 SEVERE OBESITY DUE TO EXCESS CALORIES WITHOUT SERIOUS COMORBIDITY WITH BODY MASS INDEX (BMI) OF 50.0 TO 59.9 IN ADULT (HCC): ICD-10-CM

## 2023-06-28 LAB — VIT D+METAB SERPL-MCNC: 48.8 NG/ML (ref 30–100)

## 2023-06-28 PROCEDURE — 99213 OFFICE O/P EST LOW 20 MIN: CPT | Performed by: NURSE PRACTITIONER

## 2023-07-27 ENCOUNTER — PATIENT MESSAGE (OUTPATIENT)
Dept: INTERNAL MEDICINE CLINIC | Facility: CLINIC | Age: 33
End: 2023-07-27

## 2023-07-27 DIAGNOSIS — Z51.81 ENCOUNTER FOR THERAPEUTIC DRUG MONITORING: Primary | ICD-10-CM

## 2023-07-27 DIAGNOSIS — E66.01 CLASS 3 SEVERE OBESITY DUE TO EXCESS CALORIES WITHOUT SERIOUS COMORBIDITY WITH BODY MASS INDEX (BMI) OF 50.0 TO 59.9 IN ADULT (HCC): ICD-10-CM

## 2023-07-28 NOTE — TELEPHONE ENCOUNTER
2.4mg dose she is unable to tolerate that- causing her indigestion, she even skipped a dose for a week to help it, she wants to knw if she can go lower on the dose.

## 2023-09-12 ENCOUNTER — TELEMEDICINE (OUTPATIENT)
Dept: INTERNAL MEDICINE CLINIC | Facility: CLINIC | Age: 33
End: 2023-09-12

## 2023-09-12 DIAGNOSIS — R73.03 PREDIABETES: ICD-10-CM

## 2023-09-12 DIAGNOSIS — Z51.81 ENCOUNTER FOR THERAPEUTIC DRUG MONITORING: Primary | ICD-10-CM

## 2023-09-12 DIAGNOSIS — E66.01 CLASS 3 SEVERE OBESITY DUE TO EXCESS CALORIES WITHOUT SERIOUS COMORBIDITY WITH BODY MASS INDEX (BMI) OF 50.0 TO 59.9 IN ADULT (HCC): ICD-10-CM

## 2023-09-12 PROCEDURE — 99213 OFFICE O/P EST LOW 20 MIN: CPT | Performed by: NURSE PRACTITIONER

## 2023-09-12 RX ORDER — ONDANSETRON 4 MG/1
4 TABLET, FILM COATED ORAL EVERY 8 HOURS PRN
Qty: 20 TABLET | Refills: 2 | Status: SHIPPED | OUTPATIENT
Start: 2023-09-12

## 2023-09-26 ENCOUNTER — OFFICE VISIT (OUTPATIENT)
Dept: FAMILY MEDICINE CLINIC | Facility: CLINIC | Age: 33
End: 2023-09-26
Payer: COMMERCIAL

## 2023-09-26 VITALS
RESPIRATION RATE: 18 BRPM | WEIGHT: 242 LBS | BODY MASS INDEX: 40.81 KG/M2 | DIASTOLIC BLOOD PRESSURE: 82 MMHG | HEIGHT: 64.5 IN | TEMPERATURE: 98 F | SYSTOLIC BLOOD PRESSURE: 110 MMHG | HEART RATE: 84 BPM | OXYGEN SATURATION: 99 %

## 2023-09-26 DIAGNOSIS — J02.9 SORE THROAT: ICD-10-CM

## 2023-09-26 DIAGNOSIS — J06.9 URI, ACUTE: Primary | ICD-10-CM

## 2023-09-26 LAB
CONTROL LINE PRESENT WITH A CLEAR BACKGROUND (YES/NO): YES YES/NO
KIT LOT #: NORMAL NUMERIC
OPERATOR ID: NORMAL
RAPID SARS-COV-2 BY PCR: NOT DETECTED
STREP GRP A CUL-SCR: NEGATIVE

## 2023-09-26 PROCEDURE — 3074F SYST BP LT 130 MM HG: CPT | Performed by: NURSE PRACTITIONER

## 2023-09-26 PROCEDURE — 87880 STREP A ASSAY W/OPTIC: CPT | Performed by: NURSE PRACTITIONER

## 2023-09-26 PROCEDURE — 3079F DIAST BP 80-89 MM HG: CPT | Performed by: NURSE PRACTITIONER

## 2023-09-26 PROCEDURE — U0002 COVID-19 LAB TEST NON-CDC: HCPCS | Performed by: NURSE PRACTITIONER

## 2023-09-26 PROCEDURE — 3008F BODY MASS INDEX DOCD: CPT | Performed by: NURSE PRACTITIONER

## 2023-09-26 PROCEDURE — 99213 OFFICE O/P EST LOW 20 MIN: CPT | Performed by: NURSE PRACTITIONER

## 2023-09-26 RX ORDER — MULTIVITAMIN WITH IRON
TABLET ORAL
COMMUNITY

## 2023-10-07 ENCOUNTER — OFFICE VISIT (OUTPATIENT)
Dept: FAMILY MEDICINE CLINIC | Facility: CLINIC | Age: 33
End: 2023-10-07

## 2023-10-07 VITALS
DIASTOLIC BLOOD PRESSURE: 64 MMHG | HEIGHT: 64 IN | WEIGHT: 244.63 LBS | BODY MASS INDEX: 41.76 KG/M2 | SYSTOLIC BLOOD PRESSURE: 111 MMHG

## 2023-10-07 DIAGNOSIS — Z13.220 LIPID SCREENING: ICD-10-CM

## 2023-10-07 DIAGNOSIS — D50.0 IRON DEFICIENCY ANEMIA DUE TO CHRONIC BLOOD LOSS: ICD-10-CM

## 2023-10-07 DIAGNOSIS — Z12.4 SCREENING FOR MALIGNANT NEOPLASM OF CERVIX: ICD-10-CM

## 2023-10-07 DIAGNOSIS — Z13.1 DIABETES MELLITUS SCREENING: ICD-10-CM

## 2023-10-07 DIAGNOSIS — R73.03 PREDIABETES: ICD-10-CM

## 2023-10-07 DIAGNOSIS — Z23 FLU VACCINE NEED: ICD-10-CM

## 2023-10-07 DIAGNOSIS — Z01.419 ENCOUNTER FOR GYNECOLOGICAL EXAMINATION WITHOUT ABNORMAL FINDING: Primary | ICD-10-CM

## 2023-10-07 DIAGNOSIS — Z11.51 SPECIAL SCREENING EXAMINATION FOR HUMAN PAPILLOMAVIRUS (HPV): ICD-10-CM

## 2023-10-07 PROCEDURE — 3074F SYST BP LT 130 MM HG: CPT | Performed by: FAMILY MEDICINE

## 2023-10-07 PROCEDURE — 3078F DIAST BP <80 MM HG: CPT | Performed by: FAMILY MEDICINE

## 2023-10-07 PROCEDURE — 90471 IMMUNIZATION ADMIN: CPT | Performed by: FAMILY MEDICINE

## 2023-10-07 PROCEDURE — 99385 PREV VISIT NEW AGE 18-39: CPT | Performed by: FAMILY MEDICINE

## 2023-10-07 PROCEDURE — 90686 IIV4 VACC NO PRSV 0.5 ML IM: CPT | Performed by: FAMILY MEDICINE

## 2023-10-07 PROCEDURE — 3008F BODY MASS INDEX DOCD: CPT | Performed by: FAMILY MEDICINE

## 2023-10-09 PROBLEM — D50.0 IRON DEFICIENCY ANEMIA DUE TO CHRONIC BLOOD LOSS: Status: ACTIVE | Noted: 2023-10-09

## 2023-10-09 LAB — HPV I/H RISK 1 DNA SPEC QL NAA+PROBE: NEGATIVE

## 2023-12-02 ENCOUNTER — LAB ENCOUNTER (OUTPATIENT)
Dept: LAB | Facility: HOSPITAL | Age: 33
End: 2023-12-02
Attending: FAMILY MEDICINE
Payer: COMMERCIAL

## 2023-12-02 DIAGNOSIS — R73.03 PREDIABETES: ICD-10-CM

## 2023-12-02 DIAGNOSIS — D50.0 IRON DEFICIENCY ANEMIA DUE TO CHRONIC BLOOD LOSS: ICD-10-CM

## 2023-12-02 DIAGNOSIS — Z13.220 LIPID SCREENING: ICD-10-CM

## 2023-12-02 LAB
ALBUMIN SERPL-MCNC: 3.6 G/DL (ref 3.4–5)
ALBUMIN/GLOB SERPL: 0.9 {RATIO} (ref 1–2)
ALP LIVER SERPL-CCNC: 49 U/L
ALT SERPL-CCNC: 18 U/L
ANION GAP SERPL CALC-SCNC: 5 MMOL/L (ref 0–18)
AST SERPL-CCNC: 15 U/L (ref 15–37)
BILIRUB SERPL-MCNC: 0.6 MG/DL (ref 0.1–2)
BUN BLD-MCNC: 15 MG/DL (ref 9–23)
CALCIUM BLD-MCNC: 9.2 MG/DL (ref 8.5–10.1)
CHLORIDE SERPL-SCNC: 105 MMOL/L (ref 98–112)
CHOLEST SERPL-MCNC: 199 MG/DL (ref ?–200)
CO2 SERPL-SCNC: 28 MMOL/L (ref 21–32)
CREAT BLD-MCNC: 0.76 MG/DL
DEPRECATED HBV CORE AB SER IA-ACNC: 7.9 NG/ML
EGFRCR SERPLBLD CKD-EPI 2021: 106 ML/MIN/1.73M2 (ref 60–?)
ERYTHROCYTE [DISTWIDTH] IN BLOOD BY AUTOMATED COUNT: 13.3 %
EST. AVERAGE GLUCOSE BLD GHB EST-MCNC: 108 MG/DL (ref 68–126)
FASTING PATIENT LIPID ANSWER: YES
FASTING STATUS PATIENT QL REPORTED: YES
GLOBULIN PLAS-MCNC: 3.9 G/DL (ref 2.8–4.4)
GLUCOSE BLD-MCNC: 92 MG/DL (ref 70–99)
HBA1C MFR BLD: 5.4 % (ref ?–5.7)
HCT VFR BLD AUTO: 39.4 %
HDLC SERPL-MCNC: 62 MG/DL (ref 40–59)
HGB BLD-MCNC: 13.3 G/DL
LDLC SERPL CALC-MCNC: 126 MG/DL (ref ?–100)
MCH RBC QN AUTO: 29.7 PG (ref 26–34)
MCHC RBC AUTO-ENTMCNC: 33.8 G/DL (ref 31–37)
MCV RBC AUTO: 87.9 FL
NONHDLC SERPL-MCNC: 137 MG/DL (ref ?–130)
OSMOLALITY SERPL CALC.SUM OF ELEC: 286 MOSM/KG (ref 275–295)
PLATELET # BLD AUTO: 274 10(3)UL (ref 150–450)
POTASSIUM SERPL-SCNC: 3.9 MMOL/L (ref 3.5–5.1)
PROT SERPL-MCNC: 7.5 G/DL (ref 6.4–8.2)
RBC # BLD AUTO: 4.48 X10(6)UL
SODIUM SERPL-SCNC: 138 MMOL/L (ref 136–145)
TRIGL SERPL-MCNC: 60 MG/DL (ref 30–149)
VLDLC SERPL CALC-MCNC: 11 MG/DL (ref 0–30)
WBC # BLD AUTO: 6.2 X10(3) UL (ref 4–11)

## 2023-12-02 PROCEDURE — 80053 COMPREHEN METABOLIC PANEL: CPT

## 2023-12-02 PROCEDURE — 83036 HEMOGLOBIN GLYCOSYLATED A1C: CPT

## 2023-12-02 PROCEDURE — 80061 LIPID PANEL: CPT

## 2023-12-02 PROCEDURE — 85027 COMPLETE CBC AUTOMATED: CPT

## 2023-12-02 PROCEDURE — 36415 COLL VENOUS BLD VENIPUNCTURE: CPT

## 2023-12-02 PROCEDURE — 82728 ASSAY OF FERRITIN: CPT

## 2023-12-08 DIAGNOSIS — D50.0 IRON DEFICIENCY ANEMIA DUE TO CHRONIC BLOOD LOSS: Primary | ICD-10-CM

## 2023-12-13 ENCOUNTER — OFFICE VISIT (OUTPATIENT)
Dept: INTERNAL MEDICINE CLINIC | Facility: CLINIC | Age: 33
End: 2023-12-13
Payer: COMMERCIAL

## 2023-12-13 VITALS
BODY MASS INDEX: 40.81 KG/M2 | SYSTOLIC BLOOD PRESSURE: 114 MMHG | RESPIRATION RATE: 16 BRPM | HEART RATE: 82 BPM | DIASTOLIC BLOOD PRESSURE: 78 MMHG | WEIGHT: 242 LBS | HEIGHT: 64.5 IN

## 2023-12-13 DIAGNOSIS — Z51.81 ENCOUNTER FOR THERAPEUTIC DRUG MONITORING: Primary | ICD-10-CM

## 2023-12-13 DIAGNOSIS — E66.01 CLASS 3 SEVERE OBESITY DUE TO EXCESS CALORIES WITHOUT SERIOUS COMORBIDITY WITH BODY MASS INDEX (BMI) OF 50.0 TO 59.9 IN ADULT (HCC): ICD-10-CM

## 2023-12-13 DIAGNOSIS — R73.03 PREDIABETES: ICD-10-CM

## 2023-12-13 PROCEDURE — 99213 OFFICE O/P EST LOW 20 MIN: CPT | Performed by: NURSE PRACTITIONER

## 2023-12-13 PROCEDURE — 3008F BODY MASS INDEX DOCD: CPT | Performed by: NURSE PRACTITIONER

## 2023-12-13 PROCEDURE — 3074F SYST BP LT 130 MM HG: CPT | Performed by: NURSE PRACTITIONER

## 2023-12-13 PROCEDURE — 3078F DIAST BP <80 MM HG: CPT | Performed by: NURSE PRACTITIONER

## 2023-12-13 RX ORDER — TIRZEPATIDE 10 MG/.5ML
10 INJECTION, SOLUTION SUBCUTANEOUS WEEKLY
Qty: 2 ML | Refills: 1 | Status: SHIPPED | OUTPATIENT
Start: 2023-12-13

## 2023-12-13 RX ORDER — TIRZEPATIDE 7.5 MG/.5ML
7.5 INJECTION, SOLUTION SUBCUTANEOUS WEEKLY
Qty: 2 ML | Refills: 0 | Status: SHIPPED | OUTPATIENT
Start: 2023-12-13

## 2024-01-04 ENCOUNTER — TELEPHONE (OUTPATIENT)
Dept: INTERNAL MEDICINE CLINIC | Facility: CLINIC | Age: 34
End: 2024-01-04

## 2024-01-04 NOTE — TELEPHONE ENCOUNTER
New insurance [employer]  [employer]  Entered PA for Zepbound 7.5 mg on CMM  Awaiting decision    Shannon Wheatley ([place]pherd: [id number])

## 2024-01-18 ENCOUNTER — TELEPHONE (OUTPATIENT)
Dept: INTERNAL MEDICINE CLINIC | Facility: HOSPITAL | Age: 34
End: 2024-01-18

## 2024-01-18 ENCOUNTER — LAB ENCOUNTER (OUTPATIENT)
Dept: LAB | Age: 34
End: 2024-01-18
Attending: PREVENTIVE MEDICINE
Payer: COMMERCIAL

## 2024-01-18 DIAGNOSIS — Z20.822 SUSPECTED COVID-19 VIRUS INFECTION: ICD-10-CM

## 2024-01-18 DIAGNOSIS — Z20.822 SUSPECTED COVID-19 VIRUS INFECTION: Primary | ICD-10-CM

## 2024-01-18 LAB — SARS-COV-2 RNA RESP QL NAA+PROBE: NOT DETECTED

## 2024-01-18 NOTE — TELEPHONE ENCOUNTER
Approved    Shannon Wheatley (Key: [id number])  PA Case ID #: [id number]  Need Help? Call us at [phone]  Outcome  Approved on January 4  CaseId:[id number];Status:Approved;Review Type:Prior Auth;Coverage Start Date:01/01/2024;Coverage End Date:09/27/2024;  Authorization Expiration Date: 9/26/2024  Drug  Zepbound 7.5MG/0.5ML pen-injector

## 2024-01-18 NOTE — TELEPHONE ENCOUNTER
Results and RTW guidelines:    COVID RESULT:    [x] Viewed by employee in [employer].  RTW plan and instructions as indicated on triage call.  Manager notified.  Estimated RTW date:  1/23/24  [] Discussed with employee   [] Unable to reach by phone.  Sent via [employer] message      Test type:    [x] Rapid         [] Alinity         [] Outside test:       [x] NEGATIVE     Ordered Alinity retest?  []Yes   [x] No (skip to RTW)   Ordered Rapid retest?   []Yes   [x] No (skip to RTW)           Dated to be taken:      If Yes, PLACE ORDER NOW and instruct the following:  -Originally Symptomatic or Now Symptoms:   -RTW when sx improve- fever free for 24 hours w/o medications, Diarrhea/Vomiting for 24 hours w/o medications    -Originally  Asymptomatic  -Asymptomatic AND Vaccinated or Unvaccinated or Prior infection in past 90 days:   -May work and continue to monitor symptoms for the next 10 days.                                         -Alinity test day 2, Alinity test day 5 (day 0 - exposure)      RTW PLAN:    []  If COVID positive results, off work minimum of 5 days from positive test or onset of symptoms (day 0)        On day 5, if asymptomatic or mildly symptomatic (with improving symptoms) may return to work day 6          On day 5, if symptomatic, call Employee Health for RTW screening        []  COVID positive result - call Employee Health on day 5 after symptom onset.  The employee needs to be cleared by Employee Health to RTW.  [x] RTW immediately, continue to monitor for sx  [] RTW when sx improve; must be fever free for 24 hours w/o medications, Diarrhea/Vomiting free for 24 hours w/o medications  [] Alinity ordered; continue to monitor sx and call for new/worsening sx.  Discuss RTW guidelines with manager  [] May continue to work  [] Follow up with PCP  [] Home until further instruction from hotline with Alinity results  INSTRUCTIONS PROVIDED:  [x]  Plan as noted above  []  Length of time to obtain results   [x]   Quarantine instructions  [x]  Masking protocol   [x]  S/S of worsening infection/condition and importance of prompt medical re-evaluation including when to seek emergency care  [] If symptoms develop, stay home and call hotline for rapid test order    Estimated RTW date:  pt works from home only, but RTW date 1/23/24    [] The employee voiced understanding of above plan/instructions  [x] Manager Notified

## 2024-01-18 NOTE — TELEPHONE ENCOUNTER
[x] EH  []YADIRA   [] [facility]  Manager : Thi Penny    HAVE YOU RECEIVED THE COVID-19 Vaccine? Yes [x]    No []          If yes, date(s) received:      4/16/21, 5/14/21, 1/27/22      Which vaccine:  Pfizer []     Moderna [x]    J&J []      SYMPTOMS (reported via dashboard):  [] asymptomatic  [x] symptomatic  [] GI symptoms only    Symptom onset date: 1/17/24  Fever   > 100F            Yes []      Cough                          Yes []      Shortness of breath  Yes []      Congestion                 Yes []      Runny nose                Yes []        Loss of Smell              Yes []        Loss of Taste             Yes [x]       Sore throat                 Yes []       Fatigue                        Yes []       Body Aches                Yes [x]        Chills                           Yes []        Headache                   Yes []             GI symptoms             Yes []     No [x]                     Nausea   []          Vomiting            []                                    Diarrhea  []          Upset stomach []      Employee has positive COVID Exposure?  Yes []     No [x]    Date of exposure:   []  Coworker                       [] patient                        [] Family/friend    Employee has a history of Covid?  Yes []     No [x]   If Yes, when:    When was the last shift you worked?:1/17/24      PLAN:     COVID-19 testing ordered: [x] Rapid    [] Alinity              Date test is to be taken:   1/18/24    []  Outside testing                             INSTRUCTIONS PROVIDED:    [x]  Employee was instructed to call Central scheduling at [phone] or use [employer] to make an appointment for their testing   []  May return to work if employee views negative result in MyChart and remains fever, vomiting, and diarrhea free  []  May continue to work if remains asymptomatic and views negative result in MyChart  []  Follow up for condition update when resulting  []  If symptoms develop, stay home and call hotline  for rapid test order  []  If COVID positive results, off work minimum of 5 days from positive test or onset of symptoms (day 0)     [x]  Plan noted above  [x]  Length of time to obtain results  [x]  Quarantine instructions  [x]  S/S of worsening infection/condition and importance of prompt medical re-evaluation including when to seek emergency care.   [] The employee voiced understanding      Notes:  pt works from home

## 2024-02-25 ENCOUNTER — PATIENT MESSAGE (OUTPATIENT)
Dept: INTERNAL MEDICINE CLINIC | Facility: CLINIC | Age: 34
End: 2024-02-25

## 2024-02-25 DIAGNOSIS — Z51.81 ENCOUNTER FOR THERAPEUTIC DRUG MONITORING: Primary | ICD-10-CM

## 2024-02-25 DIAGNOSIS — R73.03 PREDIABETES: ICD-10-CM

## 2024-02-25 DIAGNOSIS — E66.01 CLASS 3 SEVERE OBESITY DUE TO EXCESS CALORIES WITHOUT SERIOUS COMORBIDITY WITH BODY MASS INDEX (BMI) OF 50.0 TO 59.9 IN ADULT (HCC): ICD-10-CM

## 2024-02-26 RX ORDER — TIRZEPATIDE 12.5 MG/.5ML
12.5 INJECTION, SOLUTION SUBCUTANEOUS WEEKLY
Qty: 2 ML | Refills: 0 | Status: CANCELLED
Start: 2024-02-26

## 2024-02-26 NOTE — TELEPHONE ENCOUNTER
From: Shannon Wheatley  To: Feli Prado  Sent: 2/25/2024 12:05 PM CST  Subject: Zepbound    Hello, I am going to need a refill on the Zepbound soon, but I think I will need the next dosage up. Last refill I picked up from the pharmacy was for 10 MG.

## 2024-02-26 NOTE — TELEPHONE ENCOUNTER
Requesting   Zepbound increase    LOV: 12/13/23  RTC: 3 months  Filled: 12/13/23 #2 with 0 refills    Future Appointments   Date Time Provider Department Center   4/3/2024  5:40 PM Feli Prado APRN [facility]     Will you increase or would you like her to wait till follow up

## 2024-02-27 RX ORDER — TIRZEPATIDE 10 MG/.5ML
10 INJECTION, SOLUTION SUBCUTANEOUS WEEKLY
Qty: 2 ML | Refills: 1 | Status: SHIPPED | OUTPATIENT
Start: 2024-02-27

## 2024-03-19 ENCOUNTER — LAB ENCOUNTER (OUTPATIENT)
Dept: LAB | Facility: HOSPITAL | Age: 34
End: 2024-03-19
Attending: FAMILY MEDICINE
Payer: COMMERCIAL

## 2024-03-19 DIAGNOSIS — D50.0 IRON DEFICIENCY ANEMIA DUE TO CHRONIC BLOOD LOSS: ICD-10-CM

## 2024-03-19 LAB — DEPRECATED HBV CORE AB SER IA-ACNC: 14 NG/ML

## 2024-03-19 PROCEDURE — 82728 ASSAY OF FERRITIN: CPT

## 2024-03-19 PROCEDURE — 36415 COLL VENOUS BLD VENIPUNCTURE: CPT

## 2024-04-03 ENCOUNTER — TELEMEDICINE (OUTPATIENT)
Dept: INTERNAL MEDICINE CLINIC | Facility: CLINIC | Age: 34
End: 2024-04-03
Payer: COMMERCIAL

## 2024-04-03 DIAGNOSIS — E66.01 CLASS 3 SEVERE OBESITY DUE TO EXCESS CALORIES WITHOUT SERIOUS COMORBIDITY WITH BODY MASS INDEX (BMI) OF 50.0 TO 59.9 IN ADULT (HCC): ICD-10-CM

## 2024-04-03 DIAGNOSIS — Z51.81 ENCOUNTER FOR THERAPEUTIC DRUG MONITORING: Primary | ICD-10-CM

## 2024-04-03 DIAGNOSIS — R73.03 PREDIABETES: ICD-10-CM

## 2024-04-03 PROCEDURE — 99213 OFFICE O/P EST LOW 20 MIN: CPT | Performed by: NURSE PRACTITIONER

## 2024-04-03 RX ORDER — TIRZEPATIDE 15 MG/.5ML
15 INJECTION, SOLUTION SUBCUTANEOUS WEEKLY
Qty: 2 ML | Refills: 1 | Status: SHIPPED | OUTPATIENT
Start: 2024-04-03

## 2024-04-03 RX ORDER — TIRZEPATIDE 12.5 MG/.5ML
12.5 INJECTION, SOLUTION SUBCUTANEOUS WEEKLY
Qty: 2 ML | Refills: 0 | Status: SHIPPED | OUTPATIENT
Start: 2024-04-03

## 2024-04-03 NOTE — PATIENT INSTRUCTIONS
Continue making lifestyle changes that focus on good nutrition, regular exercise and stress management.    Medication Plan: Finish Zepbound 10 mg weekly pens and then increase to 12.5 mg weekly for 4 weeks and then full maintenance dose of 15 mg weekly.    Tips while taking an injectable weight loss medication:    Be an intuitive eater. Listen to your hunger and fullness signals, stopping when you are full.  Consume protein and produce in your day, striving for a rainbow of color of produce.  Reduce portions to staring size of 1 cup size and check in with your gut to see if you are full. Set the timer to slow down your eating pace to allow for 15-20 minutes to complete a meal.  Reduce refined sugars and high fat foods, as they may contribute to greater side effects of nausea and heartburn.  Stop eating 3 hours before bedtime to allow your food to digest.  Remain hydrated with water or non caloric and non caffeine beverages.  Use over the counter aliya lozenge/supplement to help reduce nausea if needed.    Next steps to work on before next office visit include: Great work on increasing your vegetables! Continue to focus on mindful eating, food 4 fuel and understand how certain foods impact our cravings and hunger.    Re-thinking Nutrition: Learning to See Food as Fuel  October 8, 2019  Posted in Blog, Nutrition  By Your Weight Matters [facility]    “Dieting” can start to feel challenging when we begin to associate healthy behaviors with “punishment.” Too often, we overlook the real reason we eat food. It's not only meant to be enjoyed, but also to provide basic fuel for our bodies.  Learning to see food as fuel can help you find balance in your journey with weight. It will teach you about the primal purpose of food, the effect certain foods have on health, and how to listen carefully to what your body is trying to tell you.  It Starts with Cells  Did you know your body has more than 35 trillion cells? Each one serves  a purpose.  Once a cell has completed its purpose, it dies. Your body replaces dead and worn-out cells with new and energetic ones. It also depends on this ongoing cell cycle to keep you healthy. And guess what? The foods you eat help shape this process.  Seeing Food as Fuel  Eating the right foods helps build and repair cells to be stronger than before. It does this by getting nutrition from whole grains, vitamins, minerals, fats, protein and other nutrients.  These essential nutrients matter greatly to every single cell in your body. They make up your:  Cell membrane  Nucleus  Mitochondria  When cells join together, they make tissue that brings life to your bones, brain, skin, nerves, muscles, etc. The health and lifespan of your cells depend on the nutrients you get from food. That is why healthy food choices are so important.   Once you can start to see food as being fuel for your body, you will get better at making the healthy choice the easy choice. Food will be less about rewards or punishments and more about supporting your overall health and happiness.  Building Blocks of Good Nutrition  A diet without enough fiber, protein and healthy fats can cause your cells to become brittle, leaky and tired. When cells can't do what they are designed to do, problems like inflammation, cancer and other difficult health conditions start to arise.  Foods that Support Healthy Cells:  Unsaturated Fats - Fish, nuts avocados, olive oil, flax seed, etc.  Protein - Poultry, lean beef, yogurt, eggs, seeds, beans, etc.  Antioxidants - Fruits, dark green veggies, sweet potatoes, tea, etc.  So, the next time you grab something to eat, ask yourself how that food helps or hurts your body. This is a part of living mindful and being knowledgeable about what you consume regularly.  When you start to see food as fuel, not just a tasty treat or the solution to a bad temper, you will start to make healthier choices more often. Making new  habits is one of the building blocks to successful long-term weight management!  Obesity is one of the most common health issues these days that is found among all age groups. It leads to various other diseases and disorders. We often wonder what obesity is and what makes us obese despite our rich lifestyles. Obesity is nothing but an excess of body weight caused due to overeating, overconsumption of calories, or a lack of physical activity. Stress, genetics, toxins and medicinal side effects can also lead to obesity. However, one of the major reasons behind it is Toxic Hunger.  What is Hunger?  Hunger is a feeling we all experience on a daily basis. It is very important to experience the feeling of hunger too. When the glycogen levels run low, our liver sends these signals to our brain so that we eat and replenish the glycogen levels.  However, hunger is of two types - toxic hunger and true hunger.  What is Toxic Hunger?  Our body experiences toxic hunger because we do not get a proper amount of micronutrients in our diet. Further, the food we eat these days consists of processed products and junk food that is excessively high in sugar and salt content. These are nothing but toxins which get accumulated in the intracellular regions of our body. As a result, these toxins start to metabolize in our body and their level in the blood increases, which ultimately reflects the symptoms of toxic hunger such as discomfort, fatigue, headache, weakness, and stress. These symptoms tell our body that it needs more food which makes you feel hungry. Because of this, you tend to mindlessly consume more unhealthy food. The scary part is that this process keeps repeating itself.  The reason for this toxic hunger is that these fat cells are always starving which gives incorrect signals to our brain and we continue eating more and more to feed more calories to our fat cells. This overconsumption of calories results in the problem of  obesity.  Toxic hunger gives rise to addiction and withdrawal symptoms too. When we continuously eat an excessive amount of junk and processed food, our body gets addicted to it. And if we discontinue eating these foods, we tend to become sick. This is known as a withdrawal symptom. A common example of this is the intake of coffee. There are people who are habitual of drinking 3-5 cups of coffee every day. When they don’t get their regular dose of coffee, they start experiencing headaches. These withdrawal symptoms occur because our body is now trying to repair the damage caused by the noxious food.  How do we get rid of Obesity?  It is perfectly okay to feel hungry but it is important to feel the right kind of hunger. True hunger is felt in the stomach and not in the head. Addiction, overeating, withdrawal symptoms, and food cravings can be avoided by consumption of the right type of food.  Today, we live in a world where we have easy access to a variety of processed and junk food. However, one should try to avoid these as much as possible so as to get rid of obesity. Follow these tips to reduce obesity and have a healthier body.  Add a lot of colors to your diet - Eat as many fruits and vegetables as you can. Raw fruits and vegetables have a healthy amount of fiber, and they are also low in sugar, thus keeping us feeling full for a longer period of time with the sustained release of sugar from our food. High intake of fiber helps to reduce the fat in the body..  Whole foods such as whole grains, seeds, nuts, and green leafy vegetables are high in protein and low in calories and fat. Increasing their consumption will eventually help you to cope with the toxic hunger. This will ultimately help to reduce the food cravings as well as treat obesity by reducing the body fat.  Well, friends, it’s high time you should understand the causes of your health issues and take charge of your body. Not only will it help you to  treat the disease but it will also guide you to take precautionary measures to prevent it. You cannot entirely avoid eating out and consuming junk food as we are surrounded by it. However, what you can do is to control how often you consume it and the portion size. You may have it once in a while. But make sure that the amount you consume should be just for pleasure and not for filling up your stomach to the full.  Blog post from www.SinoHub    What are proteins?  Proteins are one of three primary macronutrients that provide energy to the human body, along with fats and carbohydrates. Proteins are also responsible for a large portion of the work that is done in cells; they are necessary for proper structure and function of tissues and organs, and also act to regulate them. They are comprised of a number of amino acids that are essential to proper body function, and serve as the building blocks of body tissue.  There are 20 different amino acids in total, and the sequence of amino acids determines a protein's structure and function. While some amino acids can be synthesized in the body, there are 9 amino acids that humans can only obtain from dietary sources (insufficient amounts of which may sometimes result in death), termed essential amino acids. Foods that provide all of the essential amino acids are called complete protein sources, and include both animal (meat, dairy, eggs, fish) as well as plant-based sources (soy, quinoa, buckwheat).    Proteins can be categorized based on the function they provide to the body. Below is a list of some types of proteins:  Antibody--proteins that protect the body from foreign particles, such as viruses and bacteria, by binding to them  Enzyme--proteins that help form new molecules as well as perform the many chemical reactions that occur throughout the body  Messenger--proteins that transmit signals throughout the body to maintain body processes  Structural  component--proteins that act as building blocks for cells that ultimately allow the body to move  Transport/storage--proteins that move molecules throughout the body  As can be seen, proteins have many important roles throughout the body, and as such, it is important to provide sufficient nutrition to the body to maintain healthy protein levels.    How much protein do I need?  The amount of protein that the human body requires daily is dependent on many conditions, including overall energy intake, growth of the individual, and physical activity level. It is often estimated based on body weight, as a percentage of total caloric intake (10-35%), or based on age alone. 0.8g/kg of body weight is a commonly cited recommended dietary allowance (RDA). This value is the minimum recommended value to maintain basic nutritional requirements, but consuming more protein, up to a certain point, maybe beneficial, depending on the sources of the protein.  The recommended range of protein intake is between 0.8 g/kg and 1.8 g/kg of body weight, dependent on the many factors listed above. People who are highly active, or who wish to build more muscle should generally consume more protein. Some sources suggest consuming between 1.8 to 2 g/kg for those who are highly active. The amount of protein a person should consume, to date, is not an exact science, and each individual should consult a specialist, be it a dietitian, doctor, or , to help determine their individual needs.    Foods high in protein  There are many different combinations of food that a person can eat to meet their protein intake requirements. For many people, a large portion of protein intake comes from meat and dairy, though it is possible to get enough protein while meeting certain dietary restrictions you might have. Generally, it is easier to meet your RDA of protein by consuming meat and dairy, but an excess of either can have a negative health  impact. There are plenty of plant-based protein options, but they generally contain less protein in a given serving. Ideally, a person should consume a mixture of meat, dairy, and plant-based foods in order to meet their RDA and have a balanced diet replete with nutrients.    If possible, consuming a variety of complete proteins is recommended. A complete protein is a protein that contains a good amount of each of the nine essential amino acids required in the human diet. Examples of complete protein foods or meals include:    Meat/Dairy examples  Eggs  Chicken breast  Cottage cheese  Greek yogurt  Milk  Lean beef  Tuna  Turkey breast  Fish  Shrimp    Vegan/plant-based examples  Buckwheat  Hummus and kelesy  Soy products (tofu, tempeh, edamame beans)  Peanut butter on toast or some other bread  Beans and rice  Quinoa  Hemp and cam seeds  Spirulina  Generally, meat, poultry, fish, eggs, and dairy products are complete protein sources. Nuts and seeds, legumes, grains, and vegetables, among other things, are usually incomplete proteins. There is nothing wrong with incomplete proteins however, and there are many healthy, high protein foods that are incomplete proteins. As long as you consume a sufficient variety of incomplete proteins to get all the required amino acids, it is not necessary to specifically eat complete protein foods. In fact, certain high fat red meats for example, a common source of complete proteins, can be unhealthy.   Below are some examples of high protein foods that are not complete proteins:  Almonds  Oats  Broccoli  Lentils  Ricardo bread  Cam seeds  Pumpkin seeds  Peanuts  Hawthorne sprouts  Grapefruit  Green peas  Avocados  Mushrooms  As can be seen, there are many different foods a person can consume to meet their RDA of protein. The examples provided above do not constitute an exhaustive list of high protein or complete protein foods. As with everything else, balance is important, and the  examples provided above are an attempt at providing a list of healthier protein options (when consumed in moderation).    Amount of protein in common food      Protein Amount  Milk (1 cup/8 oz)  8 g  Egg (1 large/50 g)  6 g  Meat (1 slice / 2 oz)  14 g  Seafood (2 oz)  16 g  Bread (1 slice/64 g)   8 g  Corn (1 cup/166 g)  16 g  Rice (1 cup/195 g)  5 g  Dry Bean (1 cup/92 g)   16 g  Nuts (1 cup/92 g)    20 g  Fruits and Veggie (1 cup)  1 g    Data above taken from www.calculator.net    The Power of Protein:    High Protein Foods:  FISH  (3-6 ounces/meal)  All types of fish  Seafood (shrimp, scallops, clams, mussels, lobster)  EGGS     2-3 eggs/meal  DAIRY (2/3 to 1 ½ cup)  Cottage cheese   Greek yogurt  PLANTS (½-3/4 cup/meal)  Legumes: Dried beans and peas (black beans, louie beans, garbanzo beans, kidney, cannellini, navy, split peas, black eyed peas)  Lentils  Quinoa  Soy (edamame, tofu)  PORK   (3-6 oz/meal)  Tenderloin  Pork chop  Top loin roast, boneless  Sirloin roast, boneless  Otoe austin (nitrate free)  Boiled deli ham (nitrate free)    Additional Protein Sources:  BEEF    (3-6 oz/meal)  Flank steak       Skirt steak  Bottom round(rump roast), select   Ground beef, 90% lean (ground sirloin)  Jeffery eye steak, choice  Eye of round roast, choice   POULTRY  (3-6 oz/meal)  Ground chicken or turkey  Chicken, no skin  Turkey, no skin  PROTEIN SHAKES: OWYN (plant based and dairy free), Corepower by TitansanYany,  Protein  PROTEIN BARS: Duarte BURNETT Quest    Patient Resources:    Personal Training/Fitness Classes/Health Coaching    [facility] in [place]: Full fitness center with group fitness and personal training located in [place].  Health Coaching with Anamika Anne, Adam Limon, and Cristo Granda at our [facility]- individual coaching to work on your health goals. Call [phone] and/or email @ [email or web address]. Free 60 minute consult when client of [facility].  [facility] @ [email or web address].[address] [place], [place] [address]QBotix. A variety of group fitness options plus various yoga classes [phone] and/or email Cathryn at [email or web address]  [facility]itness Centers with multiple locations: Stantum ([email or web address] [email or web address]), Health: Elt Training (www.[email or web address].[email or web address]), [facility] Body Bootcamp (www.Skin Scan), Digby (www.Ofercity), The Exercise  (www.exercisecoach.com), Club Pilates (www.clubEcutronic Technologies.myDocket)    Online Fitness  Fitness  on CPXi  Fit in 10 DVD series   www.jpyfo94WTQQBotix  Chair exercises via Sit and Be Fit (www.sitandWabeebwafit.Eruptive Games) and Doblet (www.Quantum Voyage.com) or Kaiser Reyna or Harvey Liu videos on YouTube.  Hip Hop Fit with Sascha Bowie at www.hiphopfit.Help/Systems    Apps for on the Go Fitness  5to1 7 Minute Workout (orange box with white 7) - free on the go HIIT training elder  Peloton Elder @ www.onepeloton.com    Nutrition Trackers and Programs  LoseIT! And My Fitness Pal apps and on line for tracking nutrition  NOOM - virtual health coaching  FitFoundation (healthy meals on the go) in [place] @ www.[email or web address]  Bistro MD @ www.bistromd.myDocket and Ushzgq82 (calorie smart and low carb plans recommended) @ www.ytrltj73.com, Metabolic Meals @ www.MyMetabolicMeals.com - individual prepared meals to go  Gobble, Blue Apron, Home , Every Plate, Sunbasket- on line meal delivery programs for preparation at home  Meal Village in [place] for homemade meals to go @ www.mealvillage.com  Diet Doctor @ www.dietdoctor.com - low carb swaps  Yummly - meal prep and planning elder (www.yummly.com)    Stress, Anxiety, Depression, Trauma  CALM meditation elder (www.calm.com)  Headspace  Don't let anxiety run your life. Using the science of emotion regulation and mindfulness to overcome fear and worry by Terrence Cisneros PsyD and Vasyl Clark MA.  The Priya Johnson Podcast (September 27,  2023): 6 Magic Words That Stop Anxiety  What Happened to You?- a look at the impact trauma has on behavior written by Kady Benavides and Dr. Kurt aMllory  Whole Again by Tano Benson - discovering your true self after trauma    Mindful Eating/The Hungry Brain  Am I Hungry? Mindful eating virtual  meg (www.amihungry.com)  The Hungry Brain by Fatimah Erazo, PhD  Mindless Eating by Edwardo Hough  Weight Loss Surgery Will Not Treat Food Addiction by Tracy Rios Ph.D    Metabolic Dysfunction, Hormones and Cravings  Why We Get Sick? By Ry Dickens (insulin resistance)  Your Body in Balance: The New Science of Food, Hormones, and Health by Dr. Tristian Crawley  The Complete Guide to fasting by Dr. Tay  Fast Like a Girl by Dr. Madelyn Kern  The Menopause Reset by Dr. Madelyn Kern  Sugar, Salt & Fat by Chinyere Day, Ph.D, R.D.  The Truth About Sugar - documentary on sugar (Free on MemSQL, https://Hipcampu.be/7W3yieiPK9g)  Reverse Visceral Fat: #1 Way to Increase Your Lifespan & End Inflammation with Dr. Henry Valadez on Utube @ https://ON DEMAND Microelectronics.be/nupPRnvUpJY?si=[email or web address]    Nutrition Support  You Are What You Eat - Netfix series on twin study looking at impact of nutrition changes on health  The End of Dieting: How to Live for Life by Dr. Milind Walsh M.D. or listen to The Teads Podcast Episode 63: Understanding \"Nutritarian\" Eating w/Dr. Milind Walsh  The Game Changers- Netflix Documentary on plant based nutrition  The Dr. Wells T5 Wellness Plan by Dr. Navi Wells MD  The Complete Guide to fasting by Dr. Tay  @[email or web address] Up EnglishmeMimvi (InstSt. [facility]'s [facility] Center Dietician with support surrounding nutrition and meal prep/planning)    Education, Motivation and Support Resources  Live to 100: Secrets of the Blue Zones - Netflix series on the secrets to communities living over 100 years old  Atomic Habits by Haroldo Hurtado (a book about taking small steps to promote greater behavior change)   Motivation meg (black box with white \")-  daily supportive messages sent to your phone  Can't Hurt Me by Terrence Rehman (a book exploring the power of discipline in achieving your goals)  Fed Up - documentary about obesity (Free on Utube)  Www.yourweightmatters.org - Obesity Action Coalition sponsored Blog posts  Obesity Action Coalition Resources on topics specific to weight management (www.obesityaction.org)  Fitlosophy Fitspiration - journal to better health (journal book found at [employer] in fitness aisle)  Karel Florez talk titled: The Call to Courage (Netflix)  The Exam Room by the Physician's Committee (Podcast)  Nutrition Facts by Dr. Atkins (Podcast)

## 2024-04-03 NOTE — PROGRESS NOTES
[facility] Weight Management follow up via video visit:    Subjective    This visit is conducted using Telemedicine with live, interactive video and audio.    Chief Complaint:  Routine follow up visit for lifestyle and medical management for overweight, obesity, or morbid obesity. LOV in clinic weight: 242#.    PMH reviewed. Bariatric surgery planned or hx of surgery in the past: 0/10.    HPI:   Shannon Wheatley is a 33 year old female who is being followed up today for lifestyle and medical management as deemed appropriate for overweight, obesity or morbid obesity. Patient reports weight loss monitoring at home via scale with a weight of 237#. This appears to be a weight loss since LOV 4 months ago in clinic. Patient has been consistent with medication and tolerating Zepbound without SEs. Initially had some nausea and need Zofran, but that has now improved.    Questions/Concerns/Comments since LOV: Increased veggies in her nutrition plan.    Lifestyle/Social Hx Reviewed:    [facility] Medical Weight Loss Follow Up    Question 4/2/2024  6:01 PM CDT - Filed by Patient   Please describe a success moment: Have been hitting my vegetable servings goal   Please describe a challenging moment/needs for improvement: Eating a lot of candy, struggling to exercise   Please complete this 24 hour food journal, listing everything you had to eat in the past day. Include the average time of day you ate these meals at    List foods, qty and prep for breakfast: 7:30 Protein shake, with banan and strawberry, prunes   List foods, qty and prep for lunch. 1:00 pmColeslaw, rotisserie chicken, yogurt ranch, luis carlos habanero   List foods, qty and prep for dinner. 6:00 chicken sandwich on keto bun, sweet potato fries, chik elizabeth sauce   List foods, qty and prep for snacks. Chicken, spinach, and cauliflower rice soup   List the types and qty of fluids consumed 64oz   On average, how many meals did you eat out per week? 2   Exercise    How many days  per week are you active or exercise 2   On average, how many days were anaerobic (strength/resistance) exercises performed? 0   On average, how many days were aerobic (cardio) exercises performed? 2   Perceived level of exertion on a scale of 1-5, with 5 being very intense: 2   Stress    Average stress level on a scale of 1-10, with 10 being extremely stressed: 4   If greater than 5/1O how would you grade your coping mechanisms?    Sleep hours and integrity    How many hours of uninterrupted sleep do you get a night: 7   Do you feel rested in the morning: Yes   If no, what may have been disrupting your sleep? Tend to wake up between 3-4 AM, sometimes struggle to go back to sleep   Please list any goal(s) for your next visit Get back to exercising 5 days a work     ROS  General: feeling well, denies fatigue  CV: denies cp, palpitations  Resp: denies sob  GI: denies abdominal pain. Denies N/V/D/C.  Neuro: denies paresthesia or cognitive changes  Psych: denies any mood changes    Physical Exam:  >   BP Readings from Last 3 Encounters:   12/13/23 114/78   10/07/23 111/64   09/26/23 110/82       Home weight: see above  Home BP: not available  Home blood sugars: n/a    General: patient speaking in full sentences, no increased work of breathing. alert, appears stated age, cooperative, no distress, and moderately obese  HENT: normocephalic  Resp: Breathing is non labored  Psych: patient appears cheerful, smiling, making good eye contact    Diagnoses and all orders for this visit:    Encounter for therapeutic drug monitoring  -     Tirzepatide-Weight Management (ZEPBOUND) 12.5 MG/0.5ML Subcutaneous Solution Auto-injector; Inject 12.5 mg into the skin once a week.  -     Tirzepatide-Weight Management (ZEPBOUND) 15 MG/0.5ML Subcutaneous Solution Auto-injector; Inject 15 mg into the skin once a week. Begin after completing full 4 weeks on 12.5 mg weekly dose.    Class 3 severe obesity due to excess calories without serious  comorbidity with body mass index (BMI) of 50.0 to 59.9 in adult (HCC)  -     Tirzepatide-Weight Management (ZEPBOUND) 12.5 MG/0.5ML Subcutaneous Solution Auto-injector; Inject 12.5 mg into the skin once a week.  -     Tirzepatide-Weight Management (ZEPBOUND) 15 MG/0.5ML Subcutaneous Solution Auto-injector; Inject 15 mg into the skin once a week. Begin after completing full 4 weeks on 12.5 mg weekly dose.    Prediabetes  -     Tirzepatide-Weight Management (ZEPBOUND) 12.5 MG/0.5ML Subcutaneous Solution Auto-injector; Inject 12.5 mg into the skin once a week.  -     Tirzepatide-Weight Management (ZEPBOUND) 15 MG/0.5ML Subcutaneous Solution Auto-injector; Inject 15 mg into the skin once a week. Begin after completing full 4 weeks on 12.5 mg weekly dose.        Patient Instructions   Continue making lifestyle changes that focus on good nutrition, regular exercise and stress management.    Medication Plan: Finish Zepbound 10 mg weekly pens and then increase to 12.5 mg weekly for 4 weeks and then full maintenance dose of 15 mg weekly.    Tips while taking an injectable weight loss medication:    Be an intuitive eater. Listen to your hunger and fullness signals, stopping when you are full.  Consume protein and produce in your day, striving for a rainbow of color of produce.  Reduce portions to staring size of 1 cup size and check in with your gut to see if you are full. Set the timer to slow down your eating pace to allow for 15-20 minutes to complete a meal.  Reduce refined sugars and high fat foods, as they may contribute to greater side effects of nausea and heartburn.  Stop eating 3 hours before bedtime to allow your food to digest.  Remain hydrated with water or non caloric and non caffeine beverages.  Use over the counter alyia lozenge/supplement to help reduce nausea if needed.    Next steps to work on before next office visit include: Great work on increasing your vegetables! Continue to focus on mindful eating,  food 4 fuel and understand how certain foods impact our cravings and hunger.    Re-thinking Nutrition: Learning to See Food as Fuel  October 8, 2019  Posted in Blog, Nutrition  By Your [employer] [facility]    “Dieting” can start to feel challenging when we begin to associate healthy behaviors with “punishment.” Too often, we overlook the real reason we eat food. It's not only meant to be enjoyed, but also to provide basic fuel for our bodies.  Learning to see food as fuel can help you find balance in your journey with weight. It will teach you about the primal purpose of food, the effect certain foods have on health, and how to listen carefully to what your body is trying to tell you.  It Starts with Cells  Did you know your body has more than 35 trillion cells? Each one serves a purpose.  Once a cell has completed its purpose, it dies. Your body replaces dead and worn-out cells with new and energetic ones. It also depends on this ongoing cell cycle to keep you healthy. And guess what? The foods you eat help shape this process.  Seeing Food as Fuel  Eating the right foods helps build and repair cells to be stronger than before. It does this by getting nutrition from whole grains, vitamins, minerals, fats, protein and other nutrients.  These essential nutrients matter greatly to every single cell in your body. They make up your:  Cell membrane  Nucleus  Mitochondria  When cells join together, they make tissue that brings life to your bones, brain, skin, nerves, muscles, etc. The health and lifespan of your cells depend on the nutrients you get from food. That is why healthy food choices are so important.   Once you can start to see food as being fuel for your body, you will get better at making the healthy choice the easy choice. Food will be less about rewards or punishments and more about supporting your overall health and happiness.  Building Blocks of Good Nutrition  A diet without enough fiber, protein and  healthy fats can cause your cells to become brittle, leaky and tired. When cells can't do what they are designed to do, problems like inflammation, cancer and other difficult health conditions start to arise.  Foods that Support Healthy Cells:  Unsaturated Fats - Fish, nuts avocados, olive oil, flax seed, etc.  Protein - Poultry, lean beef, yogurt, eggs, seeds, beans, etc.  Antioxidants - Fruits, dark green veggies, sweet potatoes, tea, etc.  So, the next time you grab something to eat, ask yourself how that food helps or hurts your body. This is a part of living mindful and being knowledgeable about what you consume regularly.  When you start to see food as fuel, not just a tasty treat or the solution to a bad temper, you will start to make healthier choices more often. Making new habits is one of the building blocks to successful long-term weight management!  Obesity is one of the most common health issues these days that is found among all age groups. It leads to various other diseases and disorders. We often wonder what obesity is and what makes us obese despite our rich lifestyles. Obesity is nothing but an excess of body weight caused due to overeating, overconsumption of calories, or a lack of physical activity. Stress, genetics, toxins and medicinal side effects can also lead to obesity. However, one of the major reasons behind it is Toxic Hunger.  What is Hunger?  Hunger is a feeling we all experience on a daily basis. It is very important to experience the feeling of hunger too. When the glycogen levels run low, our liver sends these signals to our brain so that we eat and replenish the glycogen levels.  However, hunger is of two types - toxic hunger and true hunger.  What is Toxic Hunger?  Our body experiences toxic hunger because we do not get a proper amount of micronutrients in our diet. Further, the food we eat these days consists of processed products and junk food that is excessively high in sugar  and salt content. These are nothing but toxins which get accumulated in the intracellular regions of our body. As a result, these toxins start to metabolize in our body and their level in the blood increases, which ultimately reflects the symptoms of toxic hunger such as discomfort, fatigue, headache, weakness, and stress. These symptoms tell our body that it needs more food which makes you feel hungry. Because of this, you tend to mindlessly consume more unhealthy food. The scary part is that this process keeps repeating itself.  The reason for this toxic hunger is that these fat cells are always starving which gives incorrect signals to our brain and we continue eating more and more to feed more calories to our fat cells. This overconsumption of calories results in the problem of obesity.  Toxic hunger gives rise to addiction and withdrawal symptoms too. When we continuously eat an excessive amount of junk and processed food, our body gets addicted to it. And if we discontinue eating these foods, we tend to become sick. This is known as a withdrawal symptom. A common example of this is the intake of coffee. There are people who are habitual of drinking 3-5 cups of coffee every day. When they don’t get their regular dose of coffee, they start experiencing headaches. These withdrawal symptoms occur because our body is now trying to repair the damage caused by the noxious food.  How do we get rid of Obesity?  It is perfectly okay to feel hungry but it is important to feel the right kind of hunger. True hunger is felt in the stomach and not in the head. Addiction, overeating, withdrawal symptoms, and food cravings can be avoided by consumption of the right type of food.  Today, we live in a world where we have easy access to a variety of processed and junk food. However, one should try to avoid these as much as possible so as to get rid of obesity. Follow these tips to reduce obesity and have a healthier body.  Add a  lot of colors to your diet - Eat as many fruits and vegetables as you can. Raw fruits and vegetables have a healthy amount of fiber, and they are also low in sugar, thus keeping us feeling full for a longer period of time with the sustained release of sugar from our food. High intake of fiber helps to reduce the fat in the body..  Whole foods such as whole grains, seeds, nuts, and green leafy vegetables are high in protein and low in calories and fat. Increasing their consumption will eventually help you to cope with the toxic hunger. This will ultimately help to reduce the food cravings as well as treat obesity by reducing the body fat.  Well, friends, it’s high time you should understand the causes of your health issues and take charge of your body. Not only will it help you to treat the disease but it will also guide you to take precautionary measures to prevent it. You cannot entirely avoid eating out and consuming junk food as we are surrounded by it. However, what you can do is to control how often you consume it and the portion size. You may have it once in a while. But make sure that the amount you consume should be just for pleasure and not for filling up your stomach to the full.  Blog post from www.Hosted Systems.com    What are proteins?  Proteins are one of three primary macronutrients that provide energy to the human body, along with fats and carbohydrates. Proteins are also responsible for a large portion of the work that is done in cells; they are necessary for proper structure and function of tissues and organs, and also act to regulate them. They are comprised of a number of amino acids that are essential to proper body function, and serve as the building blocks of body tissue.  There are 20 different amino acids in total, and the sequence of amino acids determines a protein's structure and function. While some amino acids can be synthesized in the body, there are 9 amino acids that humans can only  obtain from dietary sources (insufficient amounts of which may sometimes result in death), termed essential amino acids. Foods that provide all of the essential amino acids are called complete protein sources, and include both animal (meat, dairy, eggs, fish) as well as plant-based sources (soy, quinoa, buckwheat).    Proteins can be categorized based on the function they provide to the body. Below is a list of some types of proteins:  Antibody--proteins that protect the body from foreign particles, such as viruses and bacteria, by binding to them  Enzyme--proteins that help form new molecules as well as perform the many chemical reactions that occur throughout the body  Messenger--proteins that transmit signals throughout the body to maintain body processes  Structural component--proteins that act as building blocks for cells that ultimately allow the body to move  Transport/storage--proteins that move molecules throughout the body  As can be seen, proteins have many important roles throughout the body, and as such, it is important to provide sufficient nutrition to the body to maintain healthy protein levels.    How much protein do I need?  The amount of protein that the human body requires daily is dependent on many conditions, including overall energy intake, growth of the individual, and physical activity level. It is often estimated based on body weight, as a percentage of total caloric intake (10-35%), or based on age alone. 0.8g/kg of body weight is a commonly cited recommended dietary allowance (RDA). This value is the minimum recommended value to maintain basic nutritional requirements, but consuming more protein, up to a certain point, maybe beneficial, depending on the sources of the protein.  The recommended range of protein intake is between 0.8 g/kg and 1.8 g/kg of body weight, dependent on the many factors listed above. People who are highly active, or who wish to build more muscle should generally  consume more protein. Some sources suggest consuming between 1.8 to 2 g/kg for those who are highly active. The amount of protein a person should consume, to date, is not an exact science, and each individual should consult a specialist, be it a dietitian, doctor, or , to help determine their individual needs.    Foods high in protein  There are many different combinations of food that a person can eat to meet their protein intake requirements. For many people, a large portion of protein intake comes from meat and dairy, though it is possible to get enough protein while meeting certain dietary restrictions you might have. Generally, it is easier to meet your RDA of protein by consuming meat and dairy, but an excess of either can have a negative health impact. There are plenty of plant-based protein options, but they generally contain less protein in a given serving. Ideally, a person should consume a mixture of meat, dairy, and plant-based foods in order to meet their RDA and have a balanced diet replete with nutrients.    If possible, consuming a variety of complete proteins is recommended. A complete protein is a protein that contains a good amount of each of the nine essential amino acids required in the human diet. Examples of complete protein foods or meals include:    Meat/Dairy examples  Eggs  Chicken breast  Cottage cheese  Greek yogurt  Milk  Lean beef  Tuna  Turkey breast  Fish  Shrimp    Vegan/plant-based examples  Buckwheat  Hummus and kelsey  Soy products (tofu, tempeh, edamame beans)  Peanut butter on toast or some other bread  Beans and rice  Quinoa  Hemp and jeannine seeds  Spirulina  Generally, meat, poultry, fish, eggs, and dairy products are complete protein sources. Nuts and seeds, legumes, grains, and vegetables, among other things, are usually incomplete proteins. There is nothing wrong with incomplete proteins however, and there are many healthy, high protein foods that are  incomplete proteins. As long as you consume a sufficient variety of incomplete proteins to get all the required amino acids, it is not necessary to specifically eat complete protein foods. In fact, certain high fat red meats for example, a common source of complete proteins, can be unhealthy.   Below are some examples of high protein foods that are not complete proteins:  Almonds  Oats  Broccoli  Lentils  Ricardo bread  Cam seeds  Pumpkin seeds  Peanuts  Celina sprouts  Grapefruit  Green peas  Avocados  Mushrooms  As can be seen, there are many different foods a person can consume to meet their RDA of protein. The examples provided above do not constitute an exhaustive list of high protein or complete protein foods. As with everything else, balance is important, and the examples provided above are an attempt at providing a list of healthier protein options (when consumed in moderation).    Amount of protein in common food      Protein Amount  Milk (1 cup/8 oz)  8 g  Egg (1 large/50 g)  6 g  Meat (1 slice / 2 oz)  14 g  Seafood (2 oz)  16 g  Bread (1 slice/64 g)   8 g  Corn (1 cup/166 g)  16 g  Rice (1 cup/195 g)  5 g  Dry Bean (1 cup/92 g)   16 g  Nuts (1 cup/92 g)    20 g  Fruits and Veggie (1 cup)  1 g    Data above taken from www.calculator.net    The Power of Protein:    High Protein Foods:  FISH  (3-6 ounces/meal)  All types of fish  Seafood (shrimp, scallops, clams, mussels, lobster)  EGGS     2-3 eggs/meal  DAIRY (2/3 to 1 ½ cup)  Cottage cheese   Greek yogurt  PLANTS (½-3/4 cup/meal)  Legumes: Dried beans and peas (black beans, louie beans, garbanzo beans, kidney, cannellini, navy, split peas, black eyed peas)  Lentils  Quinoa  Soy (edamame, tofu)  PORK   (3-6 oz/meal)  Tenderloin  Pork chop  Top loin roast, boneless  Sirloin roast, boneless  [place] austin (nitrate free)  Boiled deli ham (nitrate free)    Additional Protein Sources:  BEEF    (3-6 oz/meal)  Flank steak       Skirt steak  Bottom round(rump  roast), select   Ground beef, 90% lean (ground sirloin)  Jeffery eye steak, choice  Eye of round roast, choice   POULTRY  (3-6 oz/meal)  Ground chicken or turkey  Chicken, no skin  Turkey, no skin  PROTEIN SHAKES: OWYN (plant based and dairy free), Corepower by FormabilioYany Premier Protein  PROTEIN BARS: RXBAR, PowerCrunch, Quest    Patient Resources:    Personal Training/Fitness Classes/Health Coaching    [facility] in [place]: Full fitness center with group fitness and personal training located in [place].  Health Coaching with Anamika Anne, Adam Limon, and Cristo Granda at our [facility]- individual coaching to work on your health goals. Call [phone] and/or email @ [email or web address]. Free 60 minute consult when client of [facility].  [facility] @ [email or web address]. A variety of group fitness options plus various yoga classes [phone] and/or email Cathryn at [email or web address]  [facility] with multiple locations: Tutor (www.CombiMatrix), F45 Training (www.o98kgfpwhjfStylr), HigherNext Body Bootcamp (www.IdhasoftbodyboVascular Magneticsp.Micropoint Technologies), TaiMed Biologics (www.Wellspring Worldwide), The Exercise  (www.exercisecoach.com), Club Pilates (www.clubpilates.Micropoint Technologies)    Online Fitness  Fitness  on Utube  Fit in 10 DVD series   www.emwvh96ZBU.Micropoint Technologies  Chair exercises via Sit and Be Fit (www.sitandbefit.org) and Workspace (www.Dexcom.com) or Kaiser Reyna or Harvey Liu videos on YouTube.  Hip Hop Fit with Sascha Bowie at www.hiphopfit.net    Apps for on the Go Fitness  Rison 7 Minute Workout (orange box with white 7) - free on the go HIIT training meg  Willoton Meg @ www.onepeloton.com    Nutrition Trackers and Programs  LoseIT! And My Fitness Pal apps and on line for tracking nutrition  NOOM - virtual health coaching  FitFoundation (healthy meals on the go) in [place] @  www.vwexwrjmbgtuf4q.com  Shamar AKERS @ www.bistromd.Continuum LLC and Iiimyv53 (calorie smart and low carb plans recommended) @ www.kamycf06.com, Metabolic Meals @ www.MyMetabolicMeals.com - individual prepared meals to go  Gobble, Blue Apron, Home , Every Plate, Sunbasket- on line meal delivery programs for preparation at home  [facility] in [place] for homemade meals to go @ www.MOD Systems  Diet Doctor @ www.dietdoctor.com - low carb swaps  [employer] - meal prep and planning meg (www.yummly.com)    Stress, Anxiety, Depression, Trauma  CALM meditation meg (www.calm.com)  Headspace  Don't let anxiety run your life. Using the science of emotion regulation and mindfulness to overcome fear and worry by Terrence Cisneros PsyD and Vasyl Clark MA.  The Personal On Demand Podcast (September 27, 2023): 6 Magic Words That Stop Anxiety  What Happened to You?- a look at the impact trauma has on behavior written by Kady Benavides and Dr. Kurt Mallory  Whole Again by Tano Benson - discovering your true self after trauma    Mindful Eating/The Hungry Brain  Am I Hungry? Mindful eating virtual  meg (www.amihungry.com)  The Hungry Brain by Fatimah Erzao, PhD  Mindless Eating by Edwardo Hough  Weight Loss Surgery Will Not Treat Food Addiction by Tracy Rios Ph.D    Metabolic Dysfunction, Hormones and Cravings  Why We Get Sick? By Ry Dickens (insulin resistance)  Your Body in Balance: The New Science of Food, Hormones, and Health by Dr. Tristian Crawley  The Complete Guide to fasting by Dr. Tay  Fast Like a Girl by Dr. Madelyn Kern  The Menopause Reset by Dr. Madelyn Kern  Sugar, Salt & Fat by Chinyere Day, Ph.D, R.D.  The Truth About Sugar - documentary on sugar (Free on Eventfinda, https://youtu.be/9I7hkffDC9b)  Reverse Visceral Fat: #1 Way to Increase Your Lifespan & End Inflammation with Dr. Henry Valadez on Utube @ https://youHypericu.be/nupPRnvUpJY?si=yl4kjuCuSAU7CuyG    Nutrition Support  You Are What You Eat - Netfix series on twin  study looking at impact of nutrition changes on health  The End of Dieting: How to Live for Life by Dr. Milind Walsh M.D. or listen to The Microco.sm Podcast Episode 63: Understanding \"Nutritarian\" Eating w/Dr. Milind Walsh  The Game Changers- Netflix Documentary on plant based nutrition  The Dr. Wells T5 Wellness Plan by Dr. Navi Wells MD  The Complete Guide to fasting by Dr. Tay  @[facility] ([facility] Dietician with support surrounding nutrition and meal prep/planning)    Education, Motivation and Support Resources  Live to 100: Secrets of the Blue Zones - Netflix series on the secrets to communities living over 100 years old  Atomic Habits by Haroldo Hurtado (a book about taking small steps to promote greater behavior change)   Motivation meg (black box with white \")- daily supportive messages sent to your phone  Can't Hurt Me by Terrence Rehman (a book exploring the power of discipline in achieving your goals)  Fed Up - documentary about obesity (Free on Utube)  Www.yourweightmatters.org - Obesity [employer] Coalition sponsored Blog posts  Ob[employer] Resources on topics specific to weight management (www.obesityaction.org)  [employer] Fits[employer] - journal to better health (journal book found at [employer] in fitness aisle)  Karel Florez talk titled: The Call to Courage (Netflix)  The Exam Room by the Physician's Committee (Podcast)  Nutrition Facts by Dr. Atkins (Podcast)      Return in about 3 months (around 7/3/2024) for weight management via clinic.    DOCUMENTATION OF TIME SPENT: Code selection for this visit was based on time spent : 20 minutes on date of service in preparing to see the patient, obtaining and/or reviewing separately obtained history, performing a medically appropriate examination, counseling and educating the patient/family/caregiver, ordering medications or testing, referring and communicating with other healthcare providers, documenting clinical information in the electronic  medical record, independently interpreting results and communicating results to the patient/family/caregiver and care coordination with the patient's other providers.

## 2024-05-21 DIAGNOSIS — R73.03 PREDIABETES: ICD-10-CM

## 2024-05-21 DIAGNOSIS — E66.01 CLASS 3 SEVERE OBESITY DUE TO EXCESS CALORIES WITHOUT SERIOUS COMORBIDITY WITH BODY MASS INDEX (BMI) OF 50.0 TO 59.9 IN ADULT (HCC): ICD-10-CM

## 2024-05-21 DIAGNOSIS — Z51.81 ENCOUNTER FOR THERAPEUTIC DRUG MONITORING: ICD-10-CM

## 2024-05-22 NOTE — TELEPHONE ENCOUNTER
Requesting   Requested Prescriptions     Pending Prescriptions Disp Refills    Tirzepatide-Weight Management (ZEPBOUND) 15 MG/0.5ML Subcutaneous Solution Auto-injector 2 mL 1     Sig: Inject 15 mg into the skin once a week. Begin after completing full 4 weeks on 12.5 mg weekly dose.     LOV: 4/3/24  RTC: 3 months  Filled: 4/3/24 #2 with 1 refills    Future Appointments   Date Time Provider Department Center   8/7/2024  5:40 PM Feli Prado APRN [facility]

## 2024-05-24 RX ORDER — TIRZEPATIDE 15 MG/.5ML
15 INJECTION, SOLUTION SUBCUTANEOUS WEEKLY
Qty: 6 ML | Refills: 0 | Status: SHIPPED | OUTPATIENT
Start: 2024-05-24

## 2024-08-07 ENCOUNTER — OFFICE VISIT (OUTPATIENT)
Dept: INTERNAL MEDICINE CLINIC | Facility: CLINIC | Age: 34
End: 2024-08-07
Payer: COMMERCIAL

## 2024-08-07 VITALS
BODY MASS INDEX: 39.12 KG/M2 | SYSTOLIC BLOOD PRESSURE: 110 MMHG | HEIGHT: 64.5 IN | DIASTOLIC BLOOD PRESSURE: 77 MMHG | WEIGHT: 232 LBS | HEART RATE: 75 BPM | RESPIRATION RATE: 16 BRPM

## 2024-08-07 DIAGNOSIS — R73.03 PREDIABETES: ICD-10-CM

## 2024-08-07 DIAGNOSIS — E66.01 CLASS 3 SEVERE OBESITY DUE TO EXCESS CALORIES WITHOUT SERIOUS COMORBIDITY WITH BODY MASS INDEX (BMI) OF 50.0 TO 59.9 IN ADULT (HCC): ICD-10-CM

## 2024-08-07 DIAGNOSIS — Z51.81 ENCOUNTER FOR THERAPEUTIC DRUG MONITORING: Primary | ICD-10-CM

## 2024-08-07 PROCEDURE — 99213 OFFICE O/P EST LOW 20 MIN: CPT | Performed by: NURSE PRACTITIONER

## 2024-08-07 RX ORDER — CETIRIZINE HYDROCHLORIDE 10 MG/1
TABLET ORAL
COMMUNITY

## 2024-08-07 RX ORDER — TIRZEPATIDE 15 MG/.5ML
15 INJECTION, SOLUTION SUBCUTANEOUS WEEKLY
Qty: 6 ML | Refills: 1 | Status: SHIPPED | OUTPATIENT
Start: 2024-08-07

## 2024-08-07 RX ORDER — CALCIUM CARBONATE 300MG(750)
TABLET,CHEWABLE ORAL
COMMUNITY

## 2024-08-07 RX ORDER — MELATONIN: COMMUNITY

## 2024-08-07 NOTE — PROGRESS NOTES
Shannon Wheatley is a 33 year old female presents today for follow-up on medical weight loss program for the treatment of overweight, obesity, or morbid obesity with associated prediabetes, Vitamin D deficiency.    S:  Current weight   Wt Readings from Last 6 Encounters:   08/07/24 232 lb (105.2 kg)   12/13/23 242 lb (109.8 kg)   10/07/23 244 lb 9.6 oz (110.9 kg)   09/26/23 242 lb (109.8 kg)   06/09/23 252 lb 6.4 oz (114.5 kg)   03/08/23 257 lb (116.6 kg)    AND BMI Body mass index is 39.21 kg/m²..    Patient has lost 10# since LOV 4 months via VV and in clinic in 12/2023. She has been consistent with medication. Started exercising and less cravings for candy. Zepbound without the GI SEs she experienced with Wegovy.    Testing/consult completed since LOV: Dietician: yes    Labs: prediabetes, vitamin D deficiency, sub optimal Vitamin B12    [facility] Medical Weight Loss Follow Up    Question 8/4/2024  7:22 PM CDT - Filed by Patient   Please describe a success moment: Have started moving down again, more in control with candy, biking twice a day   Please describe a challenging moment/needs for improvement: Emotional eating still a challenge   Please complete this 24 hour food journal, listing everything you had to eat in the past day. Include the average time of day you ate these meals at    List foods, qty and prep for breakfast: 8 am-later than usual. Two eggs, with pepper, mushroom, onion. Chicken saudage, bit of foccacia   List foods, qty and prep for lunch. 1:50 pm- later than usual. Lettuce, chicken, tomato salad with a sprinkle of parm and avocado ranch dressing   List foods, qty and prep for dinner. 7:20 homemade calzone with pepperoni, mushroom, pepper, onion   List foods, qty and prep for snacks. 11:30 AM and 4:48 PM veggie straws, afternoon had 3 chicken and beggie dumplings, brownie cluster   List the types and qty of fluids consumed Over 80 oz of water, 12 oz diet coke   On average, how many meals did you eat  out per week? 1   Exercise    How many days per week are you active or exercise 7   On average, how many days were anaerobic (strength/resistance) exercises performed? 1   On average, how many days were aerobic (cardio) exercises performed? 7   Perceived level of exertion on a scale of 1-5, with 5 being very intense: 3   Stress    Average stress level on a scale of 1-10, with 10 being extremely stressed: 4   If greater than 5/1O how would you grade your coping mechanisms? moderate   Sleep hours and integrity    How many hours of uninterrupted sleep do you get a night: 7   Do you feel rested in the morning: No   If no, what may have been disrupting your sleep? The cat, sometimes I dont go to sleep early enought   Please list any goal(s) for your next visit Figure out plan for winter exercise       Social hx and PMH reviewed. Employed as a  at [employer]. Single.    REVIEW OF SYSTEMS:  GENERAL: feels well otherwise  LUNGS: denies shortness of breath with exertion  CARDIOVASCULAR: denies chest pain on exertion, denies palpitations or pedal edema  GI: denies abdominal pain.  No N/V/D/C, see above  MUSCULOSKELETAL: no acute joint or muscle pain  NEURO: denies headaches  PSYCH: denies change in behavior or mood, denies feeling sad or depressed    EXAM:  /77   Pulse 75   Resp 16   Ht 5' 4.5\" (1.638 m)   Wt 232 lb (105.2 kg)   LMP 08/05/2024 (Approximate)   BMI 39.21 kg/m²    GENERAL: well developed, well nourished, in no apparent distress, obese  EYES: conjunctiva pink, sclera non icteric, PERRLA  LUNGS: CTA in all fields, breathing non labored  CARDIO: RRR without murmur, normal S1 and S2 without clicks or gallops, no pedal edema.  GI: +BS, soft, non tender  NEURO/MS: motor and sensory grossly intact  PSYCH: pleasant, cooperative, normal mood and affect    ASSESSMENT AND PLAN:  Reviewed Initial Weight Data and Goal Weight Loss:       Encounter Diagnoses   Name Primary?    Encounter for  therapeutic drug monitoring Yes    Class 3 severe obesity due to excess calories without serious comorbidity with body mass index (BMI) of 50.0 to 59.9 in adult (HCC)     Prediabetes        No orders of the defined types were placed in this encounter.      Meds & Refills for this Visit:  Requested Prescriptions     Signed Prescriptions Disp Refills    Tirzepatide-Weight Management (ZEPBOUND) 15 MG/0.5ML Subcutaneous Solution Auto-injector 6 mL 1     Sig: Inject 15 mg into the skin once a week.       Imaging & Consults:  None      Plan:  Patient has lost 10# since LOV in 3/2023 on Zepbound 15 mg weekly with a total weight loss of 64# since initial consult on 7/13/22 with initial weight of 296#. Weight loss goal: improve health and increase energy. Reduce risk for diabetes.  CPM. Hx of Wegovy with GI SEs. Repeat BC and reviewed.  on cravings/comfort and fitness. See patient instructions below for additional plans and patient counseling.      Patient Instructions   Continue making lifestyle changes that focus on good nutrition, regular exercise and stress management.    Medication Plan: Continue Zepbound at full maintenance dose of 15 mg weekly.    Tips while taking an injectable weight loss medication:    Be an intuitive eater. Listen to your hunger and fullness signals, stopping when you are full.  Consume protein and produce in your day, striving for a rainbow of color of produce.  Reduce portions to staring size of 1 cup size and check in with your gut to see if you are full. Set the timer to slow down your eating pace to allow for 15-20 minutes to complete a meal.  Reduce refined sugars and high fat foods, as they may contribute to greater side effects of nausea and heartburn.  Stop eating 3 hours before bedtime to allow your food to digest.  Remain hydrated with water or non caloric and non caffeine beverages.  Use over the counter aliya lozenge/supplement to help reduce nausea if needed.    Next steps to  work on before next office visit include: Keep up the great work! Tips on how to reduce emotional eating listed below. Add resistance/strength training with goal of 3x/week for 30-60 minute sessions.    Repeat body composition (BC) completed today in comparison to previous BC with %change in total body fat from 47.3 to 43.4% (goal <32%), Visceral Fat from 15 to 11 (goal <10), BMI from 48.7 to 38.8 (goal <30), and muscle mass from 11.7 to 13.3% (goal >21%). Great work overall!      Comfort Foods - Why do they make us happy?    by Erma Bassett, PhD, HSPP    OAC @ www.obesityaction.org Summer 2013 Resources    “There, there. Just let me bake you some cookies.” “Don’t cry. We’ll stop and get some ice cream, and it will all be better.”    Do these sound familiar? Maybe statements that you heard as a young child? They were innocent words and very genuine actions on the part of our caregivers to express love and concern to us when we were hurting. The way they knew to do this was through food - and not just any food. Usually, the foods offered were foods rich in fats and carbohydrates - the foods that we have come to term “comfort foods.” In this way, food has come to be used as a special type of medicine, as an anti-depressant of types, to cure the mood that ails us. However, such patterns can become very problematic, especially if it is a habitual pattern causing excessive weight gain.    Scientists continue to research the effect that the chemical composition of foods may have on our moods. While such research is very valuable, I want to focus on the psychology, not the biology, of comfort food.    Food and Customs  All cultures have customs around food. In my childhood, money was scarce and when there was some kind of special occasion, it meant that we would use limited resources to buy special foods. It meant that we were being treated in a special way. Birthdays meant choosing a special meal and a type of cake and  ice cream (within a budget). Funerals involved having food brought to the bereaved. The  ritual is one of special interest to this topic. The message is quite obvious: “I hope this food makes you happier.” Again, nothing is ill-intended and the gift is given with much love and care. However, it is another reinforcement of the use of food to make us “feel better.”    We are given messages early in our lives and then reinforced throughout our lives about how food can make us feel different, to feel better. Because we equate food with happiness, we continue to turn to food for such comfort. And we do feel happy or better, albeit temporarily.    Changing Patterns  1. The key to changing this lifelong pattern of equating food with happiness is to first be aware. Take some time to reflect on how food was used through your life and its connection to emotional states for you.    2. Next, take some time to reflect on your own emotional states. You may keep a feeling journal and write down how you felt each day. In reflecting, you will be more aware of the connection of food to your feelings in the past and more aware of your feelings in the present.    3. Then, the work begins. Take each emotion connected to food and create a list of other things you may do to tend to that emotion. For instance, you may have “sadness” as one emotion that has been connected to eating. Alternative ways to get comfort when sad may be:    Talk to a friend  Cry  Journal your feelings  Listen to music  Write a song or a poem    4. By creating alternatives, you begin to see how you can break the cycle of comfort eating.    5. Post this list of alternatives in a place that you are likely to see it regularly. Consult it. Add to it as needed. Or marvel things off that you have tried that maybe didn’t work.    Food and Behavioral Conditioning  One important means of understanding the connection between food and behavior is understanding how we are  conditioned to have a certain response when we are exposed repeatedly to a stimuli. In this case, when we have been told repeatedly that we can feel better with food (the stimuli), we believe that we do indeed feel better (the response) when we eat cakes and cookies and such. However, what we don’t think about is the other stimulus, the care, the concern, the love that came with the food and how that made us feel. In other words, we may have attributed our response (feeling better) to the wrong stimulus (food) rather than the one that actually did make us feel better, which was the love we felt.    So maybe it is not the food that makes us feel happy. Maybe it is the memory of these people expressing their love and care to us. Maybe that is what really makes us happy. Patients often tell me that they eat when sad, lonely or bored. They are seeking comfort.    They want to feel “full” or “satisfied” and the food does offer that physical release. However, the true comfort that they seek cannot be found in carbohydrates or fat, but it can be found in the feeling of belonging, of connecting with others, of being creative and inspired.    Conclusion  Remember, you have had a lifetime of creating a pattern of using comfort foods, so it is not likely to change quickly. Make sure to give yourself some time to make these changes. When you are able to change the relationship with food, you are able to change your relationships with others, and you just might find more satisfying and healthier relationships.    Why Do We Experience Hunger and Have Cravings?  December 18, 2020  Posted in Blog, Nutrition  By Your Weight [employer] Campaign    Hunger and cravings  Hunger and cravings are two key factors that drive our decision to eat and influence our food choices. They are feelings and experiences that we get as a result of the complex signaling between our stomach and brain:    Gut-brain hunger signals (when your stomach tells  your brain that you are full or hungry)  The brain reward process (feeling pleasure or dakota from food)  Cognitive control processes (conscious thoughts, feelings, mindset, decisions, etc.)    The Role of Biology  Many people who struggle with weight have biological factors at play that can affect their relationship with food and food-related decisions. No two people experience hunger and respond to it the same way. It is important that the role of biology in hunger is more widely recognized to help others understand how complicated weight issues can be.    Gut-Brain Hunger Signals  Why do we get hungry? Your digestive tract has receptors that can detect when nutrients are present. When you don’t have enough nutrients, these receptors tell your stomach to produce hunger hormones that send a signal to your hypothalamus - the part of your brain that controls hunger and thirst. As a result, you get hungry and want to eat.    When we gain weight, our brains become less sensitive to signals that control how responsive our brain is to nutrients. For example, someone at a higher weight may not have as much leptin (the fullness hormone) and is more likely to feel hungry. That’s why some people affected by obesity may be more biologically likely to struggle with overeating.    Food is Rewarding  Another key element of hunger is psychology. Food is very rewarding and its effects on the brain can make it hard to resist temptation when environmental cues are everywhere. This can also cause you to eat even when you’re not hungry.    Here are two key parts of the brain’s reward system:    The Endocannabinoid System - A system of signaling molecules whose main function is to help the body maintain homeostasis (a stable internal state). It affects mood, appetite, stress, sleep and more.    The Opioid System - A system of the brain that controls pain, reward and addicting behaviors (like stress-eating or binge eating).  Both of these  systems can have powerful effects on your desire for food, your cravings, and your enjoyment of food. When we eat foods that are high in fat, sugar and salt, these systems increase activity in the brain that ramps up your desire to eat.    Cognitive Control  The third key element of hunger and cravings involve your conscious control. Cognitive control helps you decide when not to respond to food cues and hunger signals. However, it can be tough to manage when you are really hungry, stressed or upset in any way.    When we are in a better mood and not starving, it’s easier to ignore food cues and cravings. When we are stressed or depressed, we are more likely to eat based on our feelings. This is called emotional eating or stress eating.    Managing Hunger and Cravings  So, what does all this mean and how can you manage hunger and cravings? Check out the full article (https://www.obesityaction.org/community/hunger-why-do-we-have-cravings-and-what-can-ie-vz-dalvr-them/) from the [employer],  of the [employer] Mat[employer] Campaign.     Build Muscle & Lose Fat  The great fat vs muscle diagram below paints a clear picture of why it’s so important for you to build muscle in order to lose fat.  Maybe you’ve wondered about muscle vs fat and why you need to build muscle to lose fat, look slim and keep the inches off.  Well, look no further! With the fat vs muscle diagram below you’ll see why healthy permanent weight loss requires you to build muscle to lose fat.  Fat vs Muscle Diagram  The facts are clear. The best way to lose fat and look slimmer is to build muscle. Since one picture’s worth a thousand words, here’s 5 lbs of muscle vs fat of the same weight. Notice 5 lbs of fat is three times bigger.    This means that if you were to build 5 lbs of muscle and lose 5 lbs of fat, you would weigh exactly the same, but look smaller and firmer.  So imagine if it were 25 lbs or 50 lbs of lost fat vs muscle  gained.  This is why it’s possible for you to lose fat inches when exercising, yet show no change in scale weight. And can you see how firm the muscle looks compared to the lumpy, tapioca pudding consistency of fat?  Build Muscle to Lose Fat Benefits  Although daily physical activity, like walking, swimming and aerobics are essential to good heart health and weight management, combining muscle building weight training with cardiovascular exercise, gives you an unbeatable combination to lose fat and keep it off permanently.  Of course it takes a few weeks before you see any measurable changes. But you’ll start to build muscle, lose fat and burn more calories from the moment you begin weight training. Building muscle helps you:  1. Burn more calories. Unlike fat, muscle beefs up your metabolism to help you burn about 70% more calories than fat can.  2. Improve appearance. When done properly, strength training can greatly improve your posture and help to prevent joint pain.  3. Build confidence. Strong muscles and joints increase your level of confidence in your abilities to perform many lifestyle activities.  4. Prevent injury. Strength training can build stronger muscles and more limber, flexible joints, which play a crucial role in preventing injury.  5. Increase bone density. Weight bearing activities improve your bone density and reduce bone loss. This helps to prevent osteoporosis.  Studies show that weight training combined with aerobics and stretching is the best way to build a strong, firm body and keep it that way.  So, if you want to look and feel better than ever, you need to build muscle to lose fat.     Taken from www.[employer] by Valerio Lockett     the Weights and Get Off the Treadmill for Faster Results    by Bo Reyna, PhD, CISSN, ACE-CPT at [facility] Resources https://www.obesityaction.org/resources/pick-up-the-weights/    Working out and improving your fitness can seem like a daunting  task when you don’t know where to begin, which type of exercise is best, or how long you should work out. I want to help clear up any confusion and tell you what I tell my own clients - strength training is the most important thing you can do. Even if you only have 30 minutes.    The benefits of strength training are tremendous and superior in the long run (pun intended) to cardiorespiratory exercise. Cardio activities like running, cycling, rowing and others are extremely important, but if you are looking to prioritize what you do for physical activity, strength training is crucial.    What is Strength Training?    Strength training is a physical exercise that uses resistance to build muscular strength and endurance. You can use free weights, exercise machines, resistance bands or your own body weight!    Strength training can help you build muscle mass, increase bone density and improve your metabolism. As a bonus, beginners will have greater gains early on than someone who’s been strength training for many years.    What strength training does to improve your health:    Burn more fat: Muscle is more metabolically active than fat, so the more you have, the more calories you burn all day - even at the same activity level. Don’t believe the myth that a pound of muscle burns 50 calories more a day than fat. The math isn’t accurate; however, muscle does burn more energy and requires more calories during exercise, so moving them more in the gym and throughout your day will burn more calories.    Prevent injury: Strong muscles mean strong, supported bones and connective tissue, which will all help your body withstand higher levels of physical stress without injury. This is a positive side effect at any age, but especially as we get older and are more prone to falling.    Improve overall health: Studies show resistance training can enhance heart and bone health, reduce blood pressure, lower cholesterol, increase bone  density, reduce low back pain, improve sleep, and ease symptoms of arthritis and fibromyalgia. Getting older isn’t easy, but the longer you can stay healthy and active, the better.    Improve mood: Strength training releases feel-good endorphins, which reduce anxiety and can even help fight depression. Beyond supporting mental health through endorphins, strength training will help improve your self-esteem and body image.    Now that you know the numerous benefits of strength training, the next step is getting started. Always start with the basics and remember that it’s better to establish a healthy habit than it is to worry about being perfect. The perfect workout is the one that is right for you at this moment in your life.    Here are some simple steps to get you started:    Start with bodyweight exercises like squats, lunges, push-ups and planks.  Gradually add weight to your routine as you get stronger.  Focus on compound exercises that work multiple muscle groups at once. Compound exercises involve more than one joint. For example, a squat involves the hip, knee and ankle.  Aim for 2-3 strength training sessions per week.  If you are relatively new to the gym life, you may feel a bit intimidated or even uncomfortable with strength training. Don’t let ‘gym-timidation’ take away from your fitness goals. There are many ways to overcome that feeling and crush those workouts.    Start small: Begin with short workouts at home or outside.    Find a workout nayeli: Partner up to make workouts more enjoyable and less intimidating.    Try group fitness classes: Find your favorite class led by an instructor who can motivate and guide you through your workout. Group classes are great for beginners because you don’t feel like you are struggling alone. You may even find a workout partner for future sessions.    Hire a : Invest in yourself and hire a  for customized training sessions to help  meet your needs and goals. You can even hire me to help you get comfortable with those home strength-training workouts.    If going to the gym seems like an impossibility at this point, don’t worry -- you’re not alone. About one-third of respondents in a recent study said they’re too self-conscious to join a gym. If this sounds like you, I’d recommend you visit and ‘interview’ several gyms or fitness studios before deciding that going to a gym isn’t for you. Visit at a time you would normally go to see how crowded it is, what they offer, what amenities they have, and whether they specialize in specific activities such as strength training or boxing.      Medication use and SEs reviewed with patient.    Return in about 4 months (around 12/7/2024) for weight management via clinic or VV.    Patient verbalizes understanding.  Answers submitted by the patient for this visit:  Medical Weight Loss Follow Up (Submitted on 8/4/2024)  If greater than 5/1O how would you grade your coping mechanisms?: moderate

## 2024-08-09 NOTE — PATIENT INSTRUCTIONS
Continue making lifestyle changes that focus on good nutrition, regular exercise and stress management.    Medication Plan: Continue Zepbound at full maintenance dose of 15 mg weekly.    Tips while taking an injectable weight loss medication:    Be an intuitive eater. Listen to your hunger and fullness signals, stopping when you are full.  Consume protein and produce in your day, striving for a rainbow of color of produce.  Reduce portions to staring size of 1 cup size and check in with your gut to see if you are full. Set the timer to slow down your eating pace to allow for 15-20 minutes to complete a meal.  Reduce refined sugars and high fat foods, as they may contribute to greater side effects of nausea and heartburn.  Stop eating 3 hours before bedtime to allow your food to digest.  Remain hydrated with water or non caloric and non caffeine beverages.  Use over the counter aliya lozenge/supplement to help reduce nausea if needed.    Next steps to work on before next office visit include: Keep up the great work! Tips on how to reduce emotional eating listed below. Add resistance/strength training with goal of 3x/week for 30-60 minute sessions.    Repeat body composition (BC) completed today in comparison to previous BC with %change in total body fat from 47.3 to 43.4% (goal <32%), Visceral Fat from 15 to 11 (goal <10), BMI from 48.7 to 38.8 (goal <30), and muscle mass from 11.7 to 13.3% (goal >21%). Great work overall!      Comfort Foods - Why do they make us happy?    by Erma Bassett, PhD, HSPP    OAC @ www.obesityaction.org Summer 2013 Resources    “There, there. Just let me bake you some cookies.” “Don’t cry. We’ll stop and get some ice cream, and it will all be better.”    Do these sound familiar? Maybe statements that you heard as a young child? They were innocent words and very genuine actions on the part of our caregivers to express love and concern to us when we were hurting. The way they knew to do  this was through food - and not just any food. Usually, the foods offered were foods rich in fats and carbohydrates - the foods that we have come to term “comfort foods.” In this way, food has come to be used as a special type of medicine, as an anti-depressant of types, to cure the mood that ails us. However, such patterns can become very problematic, especially if it is a habitual pattern causing excessive weight gain.    Scientists continue to research the effect that the chemical composition of foods may have on our moods. While such research is very valuable, I want to focus on the psychology, not the biology, of comfort food.    Food and Customs  All cultures have customs around food. In my childhood, money was scarce and when there was some kind of special occasion, it meant that we would use limited resources to buy special foods. It meant that we were being treated in a special way. Birthdays meant choosing a special meal and a type of cake and ice cream (within a budget). Funerals involved having food brought to the bereaved. The  ritual is one of special interest to this topic. The message is quite obvious: “I hope this food makes you happier.” Again, nothing is ill-intended and the gift is given with much love and care. However, it is another reinforcement of the use of food to make us “feel better.”    We are given messages early in our lives and then reinforced throughout our lives about how food can make us feel different, to feel better. Because we equate food with happiness, we continue to turn to food for such comfort. And we do feel happy or better, albeit temporarily.    Changing Patterns  1. The key to changing this lifelong pattern of equating food with happiness is to first be aware. Take some time to reflect on how food was used through your life and its connection to emotional states for you.    2. Next, take some time to reflect on your own emotional states. You may keep a feeling  journal and write down how you felt each day. In reflecting, you will be more aware of the connection of food to your feelings in the past and more aware of your feelings in the present.    3. Then, the work begins. Take each emotion connected to food and create a list of other things you may do to tend to that emotion. For instance, you may have “sadness” as one emotion that has been connected to eating. Alternative ways to get comfort when sad may be:    Talk to a friend  Cry  Journal your feelings  Listen to music  Write a song or a poem    4. By creating alternatives, you begin to see how you can break the cycle of comfort eating.    5. Post this list of alternatives in a place that you are likely to see it regularly. Consult it. Add to it as needed. Or marvel things off that you have tried that maybe didn’t work.    Food and Behavioral Conditioning  One important means of understanding the connection between food and behavior is understanding how we are conditioned to have a certain response when we are exposed repeatedly to a stimuli. In this case, when we have been told repeatedly that we can feel better with food (the stimuli), we believe that we do indeed feel better (the response) when we eat cakes and cookies and such. However, what we don’t think about is the other stimulus, the care, the concern, the love that came with the food and how that made us feel. In other words, we may have attributed our response (feeling better) to the wrong stimulus (food) rather than the one that actually did make us feel better, which was the love we felt.    So maybe it is not the food that makes us feel happy. Maybe it is the memory of these people expressing their love and care to us. Maybe that is what really makes us happy. Patients often tell me that they eat when sad, lonely or bored. They are seeking comfort.    They want to feel “full” or “satisfied” and the food does offer that physical release. However, the true  comfort that they seek cannot be found in carbohydrates or fat, but it can be found in the feeling of belonging, of connecting with others, of being creative and inspired.    Conclusion  Remember, you have had a lifetime of creating a pattern of using comfort foods, so it is not likely to change quickly. Make sure to give yourself some time to make these changes. When you are able to change the relationship with food, you are able to change your relationships with others, and you just might find more satisfying and healthier relationships.    Why Do We Experience Hunger and Have Cravings?  December 18, 2020  Posted in Blog, Nutrition  By Your [employer]    Hunger and cravings  Hunger and cravings are two key factors that drive our decision to eat and influence our food choices. They are feelings and experiences that we get as a result of the complex signaling between our stomach and brain:    Gut-brain hunger signals (when your stomach tells your brain that you are full or hungry)  The brain reward process (feeling pleasure or dakota from food)  Cognitive control processes (conscious thoughts, feelings, mindset, decisions, etc.)    The Role of Biology  Many people who struggle with weight have biological factors at play that can affect their relationship with food and food-related decisions. No two people experience hunger and respond to it the same way. It is important that the role of biology in hunger is more widely recognized to help others understand how complicated weight issues can be.    Gut-Brain Hunger Signals  Why do we get hungry? Your digestive tract has receptors that can detect when nutrients are present. When you don’t have enough nutrients, these receptors tell your stomach to produce hunger hormones that send a signal to your hypothalamus - the part of your brain that controls hunger and thirst. As a result, you get hungry and want to eat.    When we gain weight, our brains become less  sensitive to signals that control how responsive our brain is to nutrients. For example, someone at a higher weight may not have as much leptin (the fullness hormone) and is more likely to feel hungry. That’s why some people affected by obesity may be more biologically likely to struggle with overeating.    Food is Rewarding  Another key element of hunger is psychology. Food is very rewarding and its effects on the brain can make it hard to resist temptation when environmental cues are everywhere. This can also cause you to eat even when you’re not hungry.    Here are two key parts of the brain’s reward system:    The Endocannabinoid System - A system of signaling molecules whose main function is to help the body maintain homeostasis (a stable internal state). It affects mood, appetite, stress, sleep and more.    The Opioid System - A system of the brain that controls pain, reward and addicting behaviors (like stress-eating or binge eating).  Both of these systems can have powerful effects on your desire for food, your cravings, and your enjoyment of food. When we eat foods that are high in fat, sugar and salt, these systems increase activity in the brain that ramps up your desire to eat.    Cognitive Control  The third key element of hunger and cravings involve your conscious control. Cognitive control helps you decide when not to respond to food cues and hunger signals. However, it can be tough to manage when you are really hungry, stressed or upset in any way.    When we are in a better mood and not starving, it’s easier to ignore food cues and cravings. When we are stressed or depressed, we are more likely to eat based on our feelings. This is called emotional eating or stress eating.    Managing Hunger and Cravings  So, what does all this mean and how can you manage hunger and cravings? Check out the full article  (https://www.obesityaction.org/community/hunger-why-do-we-have-cravings-and-what-can-dr-zc-sqhvx-them/) from the Ob[employer] Coalition,  of the [employer] Mat[employer] Campaign.     Build Muscle & Lose Fat  The great fat vs muscle diagram below paints a clear picture of why it’s so important for you to build muscle in order to lose fat.  Maybe you’ve wondered about muscle vs fat and why you need to build muscle to lose fat, look slim and keep the inches off.  Well, look no further! With the fat vs muscle diagram below you’ll see why healthy permanent weight loss requires you to build muscle to lose fat.  Fat vs Muscle Diagram  The facts are clear. The best way to lose fat and look slimmer is to build muscle. Since one picture’s worth a thousand words, here’s 5 lbs of muscle vs fat of the same weight. Notice 5 lbs of fat is three times bigger.    This means that if you were to build 5 lbs of muscle and lose 5 lbs of fat, you would weigh exactly the same, but look smaller and firmer.  So imagine if it were 25 lbs or 50 lbs of lost fat vs muscle gained.  This is why it’s possible for you to lose fat inches when exercising, yet show no change in scale weight. And can you see how firm the muscle looks compared to the lumpy, tapioca pudding consistency of fat?  Build Muscle to Lose Fat Benefits  Although daily physical activity, like walking, swimming and aerobics are essential to good heart health and weight management, combining muscle building weight training with cardiovascular exercise, gives you an unbeatable combination to lose fat and keep it off permanently.  Of course it takes a few weeks before you see any measurable changes. But you’ll start to build muscle, lose fat and burn more calories from the moment you begin weight training. Building muscle helps you:  1. Burn more calories. Unlike fat, muscle beefs up your metabolism to help you burn about 70% more calories than fat can.  2. Improve appearance.  When done properly, strength training can greatly improve your posture and help to prevent joint pain.  3. Build confidence. Strong muscles and joints increase your level of confidence in your abilities to perform many lifestyle activities.  4. Prevent injury. Strength training can build stronger muscles and more limber, flexible joints, which play a crucial role in preventing injury.  5. Increase bone density. Weight bearing activities improve your bone density and reduce bone loss. This helps to prevent osteoporosis.  Studies show that weight training combined with aerobics and stretching is the best way to build a strong, firm body and keep it that way.  So, if you want to look and feel better than ever, you need to build muscle to lose fat.     Taken from www.[employer] by Valerio Lockett     the Weights and Get Off the Treadmill for Faster Results    by Bo Reyna, PhD, [facility], [facility] Resources https://www.obesityaction.org/resources/pick-up-the-weights/    Working out and improving your fitness can seem like a daunting task when you don’t know where to begin, which type of exercise is best, or how long you should work out. I want to help clear up any confusion and tell you what I tell my own clients - strength training is the most important thing you can do. Even if you only have 30 minutes.    The benefits of strength training are tremendous and superior in the long run (pun intended) to cardiorespiratory exercise. Cardio activities like running, cycling, rowing and others are extremely important, but if you are looking to prioritize what you do for physical activity, strength training is crucial.    What is Strength Training?    Strength training is a physical exercise that uses resistance to build muscular strength and endurance. You can use free weights, exercise machines, resistance bands or your own body weight!    Strength training can help you build muscle mass, increase bone  density and improve your metabolism. As a bonus, beginners will have greater gains early on than someone who’s been strength training for many years.    What strength training does to improve your health:    Burn more fat: Muscle is more metabolically active than fat, so the more you have, the more calories you burn all day - even at the same activity level. Don’t believe the myth that a pound of muscle burns 50 calories more a day than fat. The math isn’t accurate; however, muscle does burn more energy and requires more calories during exercise, so moving them more in the gym and throughout your day will burn more calories.    Prevent injury: Strong muscles mean strong, supported bones and connective tissue, which will all help your body withstand higher levels of physical stress without injury. This is a positive side effect at any age, but especially as we get older and are more prone to falling.    Improve overall health: Studies show resistance training can enhance heart and bone health, reduce blood pressure, lower cholesterol, increase bone density, reduce low back pain, improve sleep, and ease symptoms of arthritis and fibromyalgia. Getting older isn’t easy, but the longer you can stay healthy and active, the better.    Improve mood: Strength training releases feel-good endorphins, which reduce anxiety and can even help fight depression. Beyond supporting mental health through endorphins, strength training will help improve your self-esteem and body image.    Now that you know the numerous benefits of strength training, the next step is getting started. Always start with the basics and remember that it’s better to establish a healthy habit than it is to worry about being perfect. The perfect workout is the one that is right for you at this moment in your life.    Here are some simple steps to get you started:    Start with bodyweight exercises like squats, lunges, push-ups and planks.  Gradually add weight  to your routine as you get stronger.  Focus on compound exercises that work multiple muscle groups at once. Compound exercises involve more than one joint. For example, a squat involves the hip, knee and ankle.  Aim for 2-3 strength training sessions per week.  If you are relatively new to the gym life, you may feel a bit intimidated or even uncomfortable with strength training. Don’t let ‘gym-timidation’ take away from your fitness goals. There are many ways to overcome that feeling and crush those workouts.    Start small: Begin with short workouts at home or outside.    Find a workout nayeli: Partner up to make workouts more enjoyable and less intimidating.    Try group fitness classes: Find your favorite class led by an instructor who can motivate and guide you through your workout. Group classes are great for beginners because you don’t feel like you are struggling alone. You may even find a workout partner for future sessions.    Hire a : Invest in yourself and hire a  for customized training sessions to help meet your needs and goals. You can even hire me to help you get comfortable with those home strength-training workouts.    If going to the gym seems like an impossibility at this point, don’t worry -- you’re not alone. About one-third of respondents in a recent study said they’re too self-conscious to join a gym. If this sounds like you, I’d recommend you visit and ‘interview’ several gyms or fitness studios before deciding that going to a gym isn’t for you. Visit at a time you would normally go to see how crowded it is, what they offer, what amenities they have, and whether they specialize in specific activities such as strength training or boxing.

## 2024-09-23 ENCOUNTER — TELEPHONE (OUTPATIENT)
Dept: INTERNAL MEDICINE CLINIC | Facility: HOSPITAL | Age: 34
End: 2024-09-23

## 2024-09-23 ENCOUNTER — LAB ENCOUNTER (OUTPATIENT)
Dept: LAB | Age: 34
End: 2024-09-23
Attending: PREVENTIVE MEDICINE
Payer: COMMERCIAL

## 2024-09-23 DIAGNOSIS — Z20.822 SUSPECTED COVID-19 VIRUS INFECTION: ICD-10-CM

## 2024-09-23 DIAGNOSIS — Z20.822 SUSPECTED COVID-19 VIRUS INFECTION: Primary | ICD-10-CM

## 2024-09-23 LAB — SARS-COV-2 RNA RESP QL NAA+PROBE: NOT DETECTED

## 2024-09-23 NOTE — TELEPHONE ENCOUNTER
Results and RTW guidelines:    COVID RESULT:    [x] Viewed by employee in [employer].  RTW plan and instructions as indicated on triage call.  Manager notified.  Estimated RTW date: 09/23/2024  [] Discussed with employee   [] Unable to reach by phone.  Sent via [employer] message      Test type:    [x] Rapid         [] Alinity         [] Outside test:       [x] NEGATIVE     Ordered Alinity retest?  []Yes   [x] No (skip to RTW)   Ordered Rapid retest?   []Yes   [x] No (skip to RTW)           Dated to be taken:      If Yes, PLACE ORDER NOW and instruct the following:  -Originally Symptomatic or Now Symptoms:   -RTW when sx improve- fever free for 24 hours w/o medications, Diarrhea/Vomiting for 24 hours w/o medications    -Originally  Asymptomatic  -Asymptomatic AND Vaccinated or Unvaccinated or Prior infection in past 90 days:   -May work and continue to monitor symptoms for the next 10 days.                                         -Alinity test day 2, Alinity test day 5 (day 0 - exposure)

## 2024-09-23 NOTE — TELEPHONE ENCOUNTER
Outside Covid Testing done    Results and RTW guidelines:    COVID RESULT reported:      Test type:    [] Rapid outside         [] PCR outside       [x] Home Test    Date of test: 09/23/2024     Test location: Home         [] Result viewed in Epic with verbal consent received from employee     [x] Results per Employee Covid Dashboard    [] Employee instructed to email copy of result to [email or web address]       [x] Discussed with employee     [] Unable to reach by phone.  Sent via [employer] message      [x] NEGATIVE     Ordered Alinity retest?  []Yes   [x] No (skip to RTW)   Ordered Rapid retest?   [x]Yes   [] No (skip to RTW)        Dated to be taken:  09/23/2024  If Yes, PLACE ORDER NOW and instruct the following:  -Originally Symptomatic or Now Symptoms:   -RTW when sx improve- fever free for 24 hours w/o medications, Diarrhea/Vomiting for 24 hours w/o medications     -Originally  Asymptomatic  -Asymptomatic AND Vaccinated or Unvaccinated or Prior infection in past 90 days:     -May work and continue to monitor symptoms for the next 14 days.                                           -Rapid test day 2, rapid test day 5 (day 0 - exposure)              Notes:     RTW PLAN:    []  If COVID positive results, off work minimum of 5 days from positive test or onset of symptoms (day 0)        On day 5, if asymptomatic or mildly symptomatic (with improving symptoms) may return to work day 6          On day 5, if symptomatic, call Employee Health for RTW screening        []  COVID positive result - call Employee Health on day 5 after symptom onset.  The employee needs to be cleared by Employee Health to RTW.  [] RTW immediately, continue to monitor for sx  [] RTW when sx improve; must be fever free for 24 hours w/o medications, Diarrhea/Vomiting free for 24 hours w/o medications  [] Alinity ordered; continue to monitor sx and call for new/worsening sx.  Discuss RTW guidelines with manager             [x] Rapid ordered  to confirm home negative.   [] May continue to work  [] Follow up with PCP  [] Home until further instruction from hotline with Alinity results  INSTRUCTIONS PROVIDED:  [x]  Plan as noted above  [x]  Length of time to obtain results  []  May return to work if views negative in My Chart and  remains fever, vomiting, and diarrhea free  []  May continue to work if remains asymptomatic   []  Quarantine instructions  []  Masking protocol  []  S/S of worsening infection/condition and importance of prompt medical re-evaluation including when to seek emergency care  [] If symptoms develop, stay home and call hotline for rapid test order    Estimated RTW date:  Pending test results  [] Manager notified        [x] E[facility]  []YADIRA   [] [facility]  Manager : Chasity Payton    [] Direct Patient Care  []Indirect Patient Contact   [x] Non-Clinical/No Patient Contact    High Risk Area:    [] Yes   [x] No    For Direct Patient Care ONLY: Have you been fitted with an N95 mask? [] Yes  [x]No      HAVE YOU RECEIVED THE COVID-19 Vaccine? Yes [x]    No []          If yes, date(s) received: 04/16/2021 05/14/2021 01/27/2022           Which vaccine:  Pfizer []     Moderna [x]    J&J []      SYMPTOMS (reported via dashboard):  [] asymptomatic  [x] symptomatic  [] GI symptoms only    Symptom onset date: 09/23/2024    Fever   > 100F             Yes []      Cough                          Yes []      Shortness of breath  Yes []      Congestion                 Yes []      Runny nose                Yes [x]        Loss of Smell              Yes []        Loss of Taste             Yes []       Sore throat                 Yes []       Fatigue                        Yes [x]       Body Aches                Yes [x]        Chills                           Yes [x]        Headache                   Yes [x]             GI symptoms             Yes []     No [x]                     Nausea   []          Vomiting            []                                    Diarrhea  []           Upset stomach []      Employee reported COVID Exposure?  Yes []     No [x]    Date of exposure:   []  Coworker                       [] patient                        [] Family/friend    PPE:   [] N95 Mask/PAPR  [] Standard Mask  [] Eyewear  [] None    Within 6 feet for >15 minutes? [] Yes []  No    Is this a true exposure? []  Yes []  No    When was the last shift you worked?: 09/23/2024    Employee has a history of Covid?  Yes []     No [x]   If Yes, when:    Are you immunocompromised? []  Yes [x]  No    Notes:

## 2024-11-07 ENCOUNTER — OFFICE VISIT (OUTPATIENT)
Dept: FAMILY MEDICINE CLINIC | Facility: CLINIC | Age: 34
End: 2024-11-07
Payer: COMMERCIAL

## 2024-11-07 VITALS
BODY MASS INDEX: 39.3 KG/M2 | HEART RATE: 94 BPM | WEIGHT: 233 LBS | TEMPERATURE: 99 F | DIASTOLIC BLOOD PRESSURE: 85 MMHG | HEIGHT: 64.5 IN | SYSTOLIC BLOOD PRESSURE: 127 MMHG

## 2024-11-07 DIAGNOSIS — R05.1 ACUTE COUGH: ICD-10-CM

## 2024-11-07 DIAGNOSIS — J02.9 SORE THROAT: ICD-10-CM

## 2024-11-07 DIAGNOSIS — J02.0 STREP PHARYNGITIS: Primary | ICD-10-CM

## 2024-11-07 LAB
CONTROL LINE PRESENT WITH A CLEAR BACKGROUND (YES/NO): YES YES/NO
KIT LOT #: NORMAL NUMERIC
STREP GRP A CUL-SCR: POSITIVE

## 2024-11-07 PROCEDURE — 87880 STREP A ASSAY W/OPTIC: CPT | Performed by: PHYSICIAN ASSISTANT

## 2024-11-07 PROCEDURE — 99213 OFFICE O/P EST LOW 20 MIN: CPT | Performed by: PHYSICIAN ASSISTANT

## 2024-11-07 RX ORDER — AZITHROMYCIN 250 MG/1
TABLET, FILM COATED ORAL
Qty: 6 TABLET | Refills: 0 | Status: SHIPPED | OUTPATIENT
Start: 2024-11-07 | End: 2024-11-11

## 2024-11-07 RX ORDER — BENZONATATE 200 MG/1
200 CAPSULE ORAL 3 TIMES DAILY PRN
Qty: 30 CAPSULE | Refills: 0 | Status: SHIPPED | OUTPATIENT
Start: 2024-11-07

## 2024-11-07 NOTE — PROGRESS NOTES
HPI:     Sore Throat   This is a new problem. Episode onset: 4 days. The problem has been gradually worsening. There has been no fever. Associated symptoms include coughing, ear pain, headaches and a hoarse voice. Pertinent negatives include no congestion, diarrhea, ear discharge, shortness of breath, swollen glands or vomiting. She has tried NSAIDs for the symptoms. The treatment provided no relief.       Medications:     Current Outpatient Medications   Medication Sig Dispense Refill    benzonatate 200 MG Oral Cap Take 1 capsule (200 mg total) by mouth 3 (three) times daily as needed for cough. 30 capsule 0    azithromycin 250 MG Oral Tab Take 2 tablets (500 mg total) by mouth daily for 1 day, THEN 1 tablet (250 mg total) daily for 4 days. 6 tablet 0    cetirizine (ALLERGY, CETIRIZINE,) 10 MG Oral Tab       Ferrous Sulfate Dried (HIGH POTENCY IRON) 65 MG Oral Tab       Cholecalciferol 125 MCG (5000 UT) Oral Tab       cyanocobalamin 1000 MCG Oral Tab       Magnesium 400 MG Oral Tab       Tirzepatide-Weight Management (ZEPBOUND) 15 MG/0.5ML Subcutaneous Solution Auto-injector Inject 15 mg into the skin once a week. 6 mL 1       Allergies:   Allergies[1]    History:     Health Maintenance   Topic Date Due    Annual Depression Screening  01/01/2024    COVID-19 Vaccine (4 - 2023-24 season) 09/01/2024    Annual Physical  10/07/2024    Pap Smear  10/07/2026    DTaP,Tdap,and Td Vaccines (2 - Td or Tdap) 09/02/2032    Influenza Vaccine  Completed    Pneumococcal Vaccine: Birth to 64yrs  Aged Out       Patient's last menstrual period was 10/20/2024 (approximate).   Past Medical History:     Past Medical History:    Obesity    Pre-diabetes       Past Surgical History:   History reviewed. No pertinent surgical history.    Family History:     Family History   Problem Relation Age of Onset    Other (Prediabetes) Mother         Resolved with bariatric surgery    Obesity Mother     Diabetes Maternal Grandfather     Breast Cancer  Neg     Uterine Cancer Neg     Ovarian Cancer Neg     Colon Cancer Neg        Social History:     Social History     Socioeconomic History    Marital status: Single     Spouse name: Not on file    Number of children: Not on file    Years of education: Not on file    Highest education level: Not on file   Occupational History    Not on file   Tobacco Use    Smoking status: Never    Smokeless tobacco: Never   Vaping Use    Vaping status: Never Used   Substance and Sexual Activity    Alcohol use: Yes     Comment: Rarely consume    Drug use: Yes     Types: Cannabis     Comment: Rarely    Sexual activity: Never   Other Topics Concern    Not on file   Social History Narrative    Not on file     Social Drivers of Health     Financial Resource Strain: Not on file   Food Insecurity: Not on file   Transportation Needs: Not on file   Physical Activity: Not on file   Stress: Not on file   Social Connections: Not on file   Housing Stability: Not on file       Review of Systems:   Review of Systems   HENT:  Positive for ear pain, hoarse voice, rhinorrhea, sneezing and sore throat. Negative for congestion and ear discharge.    Respiratory:  Positive for cough. Negative for shortness of breath.    Gastrointestinal:  Negative for diarrhea and vomiting.   Neurological:  Positive for headaches.        Vitals:    11/07/24 1134   BP: 127/85   Pulse: 94   Temp: 98.7 °F (37.1 °C)   Weight: 233 lb (105.7 kg)   Height: 5' 4.5\" (1.638 m)     Body mass index is 39.38 kg/m².    Physical Exam:   Physical Exam  Vitals reviewed.   Constitutional:       General: She is not in acute distress.     Appearance: She is well-developed.   HENT:      Right Ear: Tympanic membrane, ear canal and external ear normal. There is no impacted cerumen.      Left Ear: Tympanic membrane, ear canal and external ear normal. There is no impacted cerumen.      Nose: Nose normal.      Mouth/Throat:      Mouth: Mucous membranes are moist.      Pharynx: Oropharynx is  clear. Posterior oropharyngeal erythema present. No oropharyngeal exudate.   Eyes:      General:         Right eye: No discharge.         Left eye: No discharge.      Conjunctiva/sclera: Conjunctivae normal.   Cardiovascular:      Rate and Rhythm: Normal rate and regular rhythm.      Heart sounds: Normal heart sounds, S1 normal and S2 normal. No murmur heard.  Pulmonary:      Effort: Pulmonary effort is normal.      Breath sounds: Normal breath sounds. No wheezing or rales.   Chest:      Chest wall: No tenderness.   Lymphadenopathy:      Cervical: Cervical adenopathy present.   Skin:     Findings: No rash.   Neurological:      Mental Status: She is alert and oriented to person, place, and time.   Psychiatric:         Behavior: Behavior is cooperative.          Assessment and Plan::     Problem List Items Addressed This Visit    None  Visit Diagnoses       Strep pharyngitis    -  Primary    Relevant Medications    azithromycin 250 MG Oral Tab    Sore throat        Relevant Orders    POC Rapid Strep [01427] (Completed)    Acute cough        Relevant Medications    benzonatate 200 MG Oral Cap        Supportive care includes:    encourage fluid intake   Advise patient to take Tylenol/Ibuprofen as needed for pain.  warm salt water gargles   humidifier     Discussed plan of care with pt and pt is in agreement.All questions answered. Pt to call with questions or concerns.       [1]   Allergies  Allergen Reactions    Penicillins HIVES

## 2024-11-13 DIAGNOSIS — Z51.81 ENCOUNTER FOR THERAPEUTIC DRUG MONITORING: ICD-10-CM

## 2024-11-13 DIAGNOSIS — R73.03 PREDIABETES: ICD-10-CM

## 2024-11-13 DIAGNOSIS — E66.813 CLASS 3 SEVERE OBESITY DUE TO EXCESS CALORIES WITHOUT SERIOUS COMORBIDITY WITH BODY MASS INDEX (BMI) OF 50.0 TO 59.9 IN ADULT (HCC): ICD-10-CM

## 2024-11-13 DIAGNOSIS — E66.01 CLASS 3 SEVERE OBESITY DUE TO EXCESS CALORIES WITHOUT SERIOUS COMORBIDITY WITH BODY MASS INDEX (BMI) OF 50.0 TO 59.9 IN ADULT (HCC): ICD-10-CM

## 2024-11-14 NOTE — TELEPHONE ENCOUNTER
Requesting   Requested Prescriptions     Pending Prescriptions Disp Refills    Tirzepatide-Weight Management (ZEPBOUND) 15 MG/0.5ML Subcutaneous Solution Auto-injector 6 mL 1     Sig: Inject 15 mg into the skin once a week.      LOV: 8/7/24  RTC: 4 month  Filled: 8/7/24 #6 ml with 1 refills    Future Appointments   Date Time Provider Department Center   1/15/2025  5:40 PM Feli Prado APRN [facility] [address]   5/14/2025  5:00 PM Feli Prado APRN [facility] [address]

## 2024-11-17 RX ORDER — TIRZEPATIDE 15 MG/.5ML
15 INJECTION, SOLUTION SUBCUTANEOUS WEEKLY
Qty: 6 ML | Refills: 0 | Status: SHIPPED | OUTPATIENT
Start: 2024-11-17

## 2024-11-18 ENCOUNTER — TELEPHONE (OUTPATIENT)
Dept: INTERNAL MEDICINE CLINIC | Facility: CLINIC | Age: 34
End: 2024-11-18

## 2024-11-18 NOTE — TELEPHONE ENCOUNTER
PA needed for zepbound 15 mg  Can be done on [facility]  KEY   [id number]    PA completed in epic today  Awaiting approval or denial

## 2024-11-18 NOTE — TELEPHONE ENCOUNTER
approved    Authorized from October 19, 2024 to November 18, 2025     Patient informed via [employer]

## 2024-11-19 ENCOUNTER — TELEPHONE (OUTPATIENT)
Dept: INTERNAL MEDICINE CLINIC | Facility: CLINIC | Age: 34
End: 2024-11-19

## 2024-12-10 ENCOUNTER — OFFICE VISIT (OUTPATIENT)
Dept: FAMILY MEDICINE CLINIC | Facility: CLINIC | Age: 34
End: 2024-12-10
Payer: COMMERCIAL

## 2024-12-10 VITALS
HEART RATE: 66 BPM | BODY MASS INDEX: 39.8 KG/M2 | HEIGHT: 64.5 IN | WEIGHT: 236 LBS | DIASTOLIC BLOOD PRESSURE: 79 MMHG | SYSTOLIC BLOOD PRESSURE: 114 MMHG

## 2024-12-10 DIAGNOSIS — Z00.00 ROUTINE GENERAL MEDICAL EXAMINATION AT A HEALTH CARE FACILITY: Primary | ICD-10-CM

## 2024-12-10 DIAGNOSIS — E66.812 CLASS 2 OBESITY DUE TO EXCESS CALORIES WITHOUT SERIOUS COMORBIDITY WITH BODY MASS INDEX (BMI) OF 39.0 TO 39.9 IN ADULT: ICD-10-CM

## 2024-12-10 DIAGNOSIS — D50.0 IRON DEFICIENCY ANEMIA DUE TO CHRONIC BLOOD LOSS: ICD-10-CM

## 2024-12-10 DIAGNOSIS — E66.09 CLASS 2 OBESITY DUE TO EXCESS CALORIES WITHOUT SERIOUS COMORBIDITY WITH BODY MASS INDEX (BMI) OF 39.0 TO 39.9 IN ADULT: ICD-10-CM

## 2024-12-10 PROBLEM — E66.813 CLASS 3 SEVERE OBESITY DUE TO EXCESS CALORIES WITHOUT SERIOUS COMORBIDITY WITH BODY MASS INDEX (BMI) OF 50.0 TO 59.9 IN ADULT (HCC): Status: RESOLVED | Noted: 2022-07-13 | Resolved: 2024-12-10

## 2024-12-10 PROBLEM — E66.01 CLASS 3 SEVERE OBESITY DUE TO EXCESS CALORIES WITHOUT SERIOUS COMORBIDITY WITH BODY MASS INDEX (BMI) OF 50.0 TO 59.9 IN ADULT (HCC): Status: RESOLVED | Noted: 2022-07-13 | Resolved: 2024-12-10

## 2024-12-10 PROBLEM — E66.813 CLASS 3 SEVERE OBESITY DUE TO EXCESS CALORIES WITHOUT SERIOUS COMORBIDITY WITH BODY MASS INDEX (BMI) OF 50.0 TO 59.9 IN ADULT: Status: RESOLVED | Noted: 2022-07-13 | Resolved: 2024-12-10

## 2024-12-10 PROCEDURE — 99395 PREV VISIT EST AGE 18-39: CPT | Performed by: PHYSICIAN ASSISTANT

## 2024-12-10 NOTE — PROGRESS NOTES
HPI:   Shannon Wheatley is a 34 year old female who presents for an Annual Health Visit.   The patient is currently feeling well. The patient denies chest pain, SOB, N/V/C/D, fever, dizziness, syncope, and abdominal pain. There are no other concerns today.      Allergies:   Allergies[1]    CURRENT MEDICATIONS   Current Outpatient Medications   Medication Sig Dispense Refill    Tirzepatide-Weight Management (ZEPBOUND) 15 MG/0.5ML Subcutaneous Solution Auto-injector Inject 15 mg into the skin once a week. 6 mL 0    cetirizine (ALLERGY, CETIRIZINE,) 10 MG Oral Tab       Ferrous Sulfate Dried (HIGH POTENCY IRON) 65 MG Oral Tab       Cholecalciferol 125 MCG (5000 UT) Oral Tab       cyanocobalamin 1000 MCG Oral Tab       Magnesium 400 MG Oral Tab         HISTORICAL INFORMATION   Past Medical History:    Obesity    Pre-diabetes      History reviewed. No pertinent surgical history.   Family History   Problem Relation Age of Onset    Other (Prediabetes) Mother         Resolved with bariatric surgery    Obesity Mother     Diabetes Maternal Grandfather     Breast Cancer Neg     Uterine Cancer Neg     Ovarian Cancer Neg     Colon Cancer Neg       SOCIAL HISTORY   Social History     Socioeconomic History    Marital status: Single   Tobacco Use    Smoking status: Never    Smokeless tobacco: Never   Vaping Use    Vaping status: Never Used   Substance and Sexual Activity    Alcohol use: Yes     Comment: Rarely consume    Drug use: Yes     Types: Cannabis     Comment: Rarely    Sexual activity: Never     Social History     Social History Narrative    Not on file        REVIEW OF SYSTEMS:     Review of Systems   Constitutional: Negative.    HENT: Negative.     Eyes: Negative.    Respiratory: Negative.     Cardiovascular: Negative.    Gastrointestinal: Negative.    Genitourinary: Negative.    Musculoskeletal: Negative.    Skin: Negative.    Neurological: Negative.    Psychiatric/Behavioral: Negative.           EXAM:   /79    Pulse 66   Ht 5' 4.5\" (1.638 m)   Wt 236 lb (107 kg)   LMP 12/07/2024 (Approximate)   BMI 39.88 kg/m²    Wt Readings from Last 6 Encounters:   12/10/24 236 lb (107 kg)   11/07/24 233 lb (105.7 kg)   08/07/24 232 lb (105.2 kg)   12/13/23 242 lb (109.8 kg)   10/07/23 244 lb 9.6 oz (110.9 kg)   09/26/23 242 lb (109.8 kg)     Body mass index is 39.88 kg/m².    Physical Exam  Vitals reviewed.   Constitutional:       Appearance: She is well-developed.   HENT:      Head: Normocephalic and atraumatic.      Right Ear: Tympanic membrane, ear canal and external ear normal. There is no impacted cerumen.      Left Ear: Tympanic membrane, ear canal and external ear normal. There is no impacted cerumen.      Nose: Nose normal.      Mouth/Throat:      Mouth: Mucous membranes are moist.      Pharynx: Oropharynx is clear. No oropharyngeal exudate or posterior oropharyngeal erythema.   Eyes:      General:         Right eye: No discharge.         Left eye: No discharge.      Conjunctiva/sclera: Conjunctivae normal.   Cardiovascular:      Rate and Rhythm: Normal rate and regular rhythm.      Heart sounds: Normal heart sounds.   Pulmonary:      Effort: Pulmonary effort is normal.      Breath sounds: Normal breath sounds.   Abdominal:      General: Abdomen is flat. Bowel sounds are normal. There is no distension.      Palpations: Abdomen is soft.      Tenderness: There is no abdominal tenderness. There is no right CVA tenderness or left CVA tenderness.   Genitourinary:     Vagina: Normal.   Musculoskeletal:         General: Normal range of motion.      Cervical back: Normal range of motion and neck supple.   Skin:     Findings: No rash.   Neurological:      Mental Status: She is alert and oriented to person, place, and time.   Psychiatric:         Mood and Affect: Mood normal.         Behavior: Behavior normal.         Thought Content: Thought content normal.         Judgment: Judgment normal.          ASSESSMENT AND PLAN:   Shannon  was seen today for routine physical.    Diagnoses and all orders for this visit:    Routine general medical examination at a health care facility  -     CBC With Differential With Platelet; Future  -     Comp Metabolic Panel (14); Future  -     Hemoglobin A1C; Future  -     Lipid Panel; Future  -     TSH W Reflex To Free T4; Future  -     Ferritin; Future  -     Iron And Tibc; Future    Class 2 obesity due to excess calories without serious comorbidity with body mass index (BMI) of 39.0 to 39.9 in adult  -     CBC With Differential With Platelet; Future  -     Comp Metabolic Panel (14); Future  -     Hemoglobin A1C; Future  -     Lipid Panel; Future  -     TSH W Reflex To Free T4; Future    Iron deficiency anemia due to chronic blood loss  -     CBC With Differential With Platelet; Future  -     Ferritin; Future  -     Iron And Tibc; Future    Overall health discussed, exercise/activity appropriate for age and health status, heathy diety, preventive care, and upcoming screening discussed. Routine labs ordered.    There are no Patient Instructions on file for this visit.    The patient indicates understanding of these issues and agrees to the plan.    Problem List:  Patient Active Problem List   Diagnosis    Encounter for therapeutic drug monitoring    Prediabetes    Iron deficiency anemia due to chronic blood loss    Class 2 obesity due to excess calories without serious comorbidity with body mass index (BMI) of 39.0 to 39.9 in adult       Beth Valerio PA-C  12/10/2024  5:57 PM               [1]   Allergies  Allergen Reactions    Penicillins HIVES

## 2024-12-14 DIAGNOSIS — E66.813 CLASS 3 SEVERE OBESITY DUE TO EXCESS CALORIES WITHOUT SERIOUS COMORBIDITY WITH BODY MASS INDEX (BMI) OF 50.0 TO 59.9 IN ADULT (HCC): ICD-10-CM

## 2024-12-14 DIAGNOSIS — Z51.81 ENCOUNTER FOR THERAPEUTIC DRUG MONITORING: ICD-10-CM

## 2024-12-14 DIAGNOSIS — E66.01 CLASS 3 SEVERE OBESITY DUE TO EXCESS CALORIES WITHOUT SERIOUS COMORBIDITY WITH BODY MASS INDEX (BMI) OF 50.0 TO 59.9 IN ADULT (HCC): ICD-10-CM

## 2024-12-14 DIAGNOSIS — R73.03 PREDIABETES: ICD-10-CM

## 2024-12-15 ENCOUNTER — LAB ENCOUNTER (OUTPATIENT)
Dept: LAB | Age: 34
End: 2024-12-15
Attending: PHYSICIAN ASSISTANT
Payer: COMMERCIAL

## 2024-12-15 DIAGNOSIS — D50.0 IRON DEFICIENCY ANEMIA DUE TO CHRONIC BLOOD LOSS: ICD-10-CM

## 2024-12-15 DIAGNOSIS — E66.812 CLASS 2 OBESITY DUE TO EXCESS CALORIES WITHOUT SERIOUS COMORBIDITY WITH BODY MASS INDEX (BMI) OF 39.0 TO 39.9 IN ADULT: ICD-10-CM

## 2024-12-15 DIAGNOSIS — E66.09 CLASS 2 OBESITY DUE TO EXCESS CALORIES WITHOUT SERIOUS COMORBIDITY WITH BODY MASS INDEX (BMI) OF 39.0 TO 39.9 IN ADULT: ICD-10-CM

## 2024-12-15 DIAGNOSIS — Z00.00 ROUTINE GENERAL MEDICAL EXAMINATION AT A HEALTH CARE FACILITY: ICD-10-CM

## 2024-12-15 LAB
ALBUMIN SERPL-MCNC: 4.4 G/DL (ref 3.2–4.8)
ALBUMIN/GLOB SERPL: 1.5 {RATIO} (ref 1–2)
ALP LIVER SERPL-CCNC: 53 U/L
ALT SERPL-CCNC: 15 U/L
ANION GAP SERPL CALC-SCNC: 6 MMOL/L (ref 0–18)
AST SERPL-CCNC: 26 U/L (ref ?–34)
BASOPHILS # BLD AUTO: 0.07 X10(3) UL (ref 0–0.2)
BASOPHILS NFR BLD AUTO: 1.2 %
BILIRUB SERPL-MCNC: 0.7 MG/DL (ref 0.3–1.2)
BUN BLD-MCNC: 13 MG/DL (ref 9–23)
BUN/CREAT SERPL: 18.6 (ref 10–20)
CALCIUM BLD-MCNC: 9.5 MG/DL (ref 8.7–10.4)
CHLORIDE SERPL-SCNC: 106 MMOL/L (ref 98–112)
CHOLEST SERPL-MCNC: 209 MG/DL (ref ?–200)
CO2 SERPL-SCNC: 25 MMOL/L (ref 21–32)
CREAT BLD-MCNC: 0.7 MG/DL
DEPRECATED HBV CORE AB SER IA-ACNC: 16 NG/ML
DEPRECATED RDW RBC AUTO: 42.8 FL (ref 35.1–46.3)
EGFRCR SERPLBLD CKD-EPI 2021: 116 ML/MIN/1.73M2 (ref 60–?)
EOSINOPHIL # BLD AUTO: 0.2 X10(3) UL (ref 0–0.7)
EOSINOPHIL NFR BLD AUTO: 3.5 %
ERYTHROCYTE [DISTWIDTH] IN BLOOD BY AUTOMATED COUNT: 13 % (ref 11–15)
EST. AVERAGE GLUCOSE BLD GHB EST-MCNC: 103 MG/DL (ref 68–126)
FASTING PATIENT LIPID ANSWER: YES
FASTING STATUS PATIENT QL REPORTED: YES
GLOBULIN PLAS-MCNC: 2.9 G/DL (ref 2–3.5)
GLUCOSE BLD-MCNC: 87 MG/DL (ref 70–99)
HBA1C MFR BLD: 5.2 % (ref ?–5.7)
HCT VFR BLD AUTO: 38.3 %
HDLC SERPL-MCNC: 59 MG/DL (ref 40–59)
HGB BLD-MCNC: 13.6 G/DL
IMM GRANULOCYTES # BLD AUTO: 0.01 X10(3) UL (ref 0–1)
IMM GRANULOCYTES NFR BLD: 0.2 %
IRON SATN MFR SERPL: 40 %
IRON SERPL-MCNC: 130 UG/DL
LDLC SERPL CALC-MCNC: 138 MG/DL (ref ?–100)
LYMPHOCYTES # BLD AUTO: 1.66 X10(3) UL (ref 1–4)
LYMPHOCYTES NFR BLD AUTO: 28.8 %
MCH RBC QN AUTO: 32.2 PG (ref 26–34)
MCHC RBC AUTO-ENTMCNC: 35.5 G/DL (ref 31–37)
MCV RBC AUTO: 90.8 FL
MONOCYTES # BLD AUTO: 0.36 X10(3) UL (ref 0.1–1)
MONOCYTES NFR BLD AUTO: 6.3 %
NEUTROPHILS # BLD AUTO: 3.46 X10 (3) UL (ref 1.5–7.7)
NEUTROPHILS # BLD AUTO: 3.46 X10(3) UL (ref 1.5–7.7)
NEUTROPHILS NFR BLD AUTO: 60 %
NONHDLC SERPL-MCNC: 150 MG/DL (ref ?–130)
OSMOLALITY SERPL CALC.SUM OF ELEC: 283 MOSM/KG (ref 275–295)
PLATELET # BLD AUTO: 246 10(3)UL (ref 150–450)
POTASSIUM SERPL-SCNC: 4.1 MMOL/L (ref 3.5–5.1)
PROT SERPL-MCNC: 7.3 G/DL (ref 5.7–8.2)
RBC # BLD AUTO: 4.22 X10(6)UL
SODIUM SERPL-SCNC: 137 MMOL/L (ref 136–145)
TIBC SERPL-MCNC: 329 UG/DL (ref 250–425)
TRANSFERRIN SERPL-MCNC: 221 MG/DL (ref 250–380)
TRIGL SERPL-MCNC: 65 MG/DL (ref 30–149)
TSI SER-ACNC: 1.13 UIU/ML (ref 0.55–4.78)
VLDLC SERPL CALC-MCNC: 12 MG/DL (ref 0–30)
WBC # BLD AUTO: 5.8 X10(3) UL (ref 4–11)

## 2024-12-15 PROCEDURE — 80053 COMPREHEN METABOLIC PANEL: CPT

## 2024-12-15 PROCEDURE — 36415 COLL VENOUS BLD VENIPUNCTURE: CPT

## 2024-12-15 PROCEDURE — 83036 HEMOGLOBIN GLYCOSYLATED A1C: CPT

## 2024-12-15 PROCEDURE — 83540 ASSAY OF IRON: CPT

## 2024-12-15 PROCEDURE — 82728 ASSAY OF FERRITIN: CPT

## 2024-12-15 PROCEDURE — 85025 COMPLETE CBC W/AUTO DIFF WBC: CPT

## 2024-12-15 PROCEDURE — 84466 ASSAY OF TRANSFERRIN: CPT

## 2024-12-15 PROCEDURE — 80061 LIPID PANEL: CPT

## 2024-12-15 PROCEDURE — 84443 ASSAY THYROID STIM HORMONE: CPT

## 2024-12-16 ENCOUNTER — PATIENT MESSAGE (OUTPATIENT)
Dept: INTERNAL MEDICINE CLINIC | Facility: CLINIC | Age: 34
End: 2024-12-16

## 2024-12-16 RX ORDER — TIRZEPATIDE 15 MG/.5ML
15 INJECTION, SOLUTION SUBCUTANEOUS WEEKLY
Qty: 6 ML | Refills: 0 | OUTPATIENT
Start: 2024-12-16

## 2024-12-16 NOTE — TELEPHONE ENCOUNTER
Requesting zepbound 15  LOV: 8/7/24  RTC: 4 months  Filled: 11/17/24 #6ml with 0 refills    Future Appointments   Date Time Provider Department Center   1/15/2025  5:40 PM Feli Prado APRN [facility] [address]   5/14/2025  5:00 PM Feli Prado APRN [facility] [address]   8/13/2025  5:40 PM Feli Prado APRN [facility] [address]     Too soon for refill

## 2025-01-07 ENCOUNTER — PATIENT MESSAGE (OUTPATIENT)
Dept: INTERNAL MEDICINE CLINIC | Facility: CLINIC | Age: 35
End: 2025-01-07

## 2025-01-07 DIAGNOSIS — E66.813 CLASS 3 SEVERE OBESITY DUE TO EXCESS CALORIES WITHOUT SERIOUS COMORBIDITY WITH BODY MASS INDEX (BMI) OF 50.0 TO 59.9 IN ADULT (HCC): ICD-10-CM

## 2025-01-07 DIAGNOSIS — Z51.81 ENCOUNTER FOR THERAPEUTIC DRUG MONITORING: ICD-10-CM

## 2025-01-07 DIAGNOSIS — E66.01 CLASS 3 SEVERE OBESITY DUE TO EXCESS CALORIES WITHOUT SERIOUS COMORBIDITY WITH BODY MASS INDEX (BMI) OF 50.0 TO 59.9 IN ADULT (HCC): ICD-10-CM

## 2025-01-07 DIAGNOSIS — R73.03 PREDIABETES: ICD-10-CM

## 2025-01-08 RX ORDER — TIRZEPATIDE 15 MG/.5ML
15 INJECTION, SOLUTION SUBCUTANEOUS WEEKLY
Qty: 2 ML | Refills: 0 | Status: SHIPPED | OUTPATIENT
Start: 2025-01-08

## 2025-01-08 NOTE — TELEPHONE ENCOUNTER
Zepbound 15 mg sent to [facility] as she had one refill remaining from the 3 month approved.  PA was required and completed  Approved    Prior Authorization History  Tirzepatide-Weight Management (ZEPBOUND) 15 MG/0.5ML Subcutaneous Solution Auto-injector     Approval Details    Authorized from December 9, 2024 to January 8, 2026  Information received electronically from payer

## 2025-01-15 ENCOUNTER — TELEMEDICINE (OUTPATIENT)
Dept: INTERNAL MEDICINE CLINIC | Facility: CLINIC | Age: 35
End: 2025-01-15
Payer: COMMERCIAL

## 2025-01-15 DIAGNOSIS — E66.01 CLASS 3 SEVERE OBESITY DUE TO EXCESS CALORIES WITHOUT SERIOUS COMORBIDITY WITH BODY MASS INDEX (BMI) OF 50.0 TO 59.9 IN ADULT (HCC): ICD-10-CM

## 2025-01-15 DIAGNOSIS — R73.03 PREDIABETES: ICD-10-CM

## 2025-01-15 DIAGNOSIS — E66.813 CLASS 3 SEVERE OBESITY DUE TO EXCESS CALORIES WITHOUT SERIOUS COMORBIDITY WITH BODY MASS INDEX (BMI) OF 50.0 TO 59.9 IN ADULT (HCC): ICD-10-CM

## 2025-01-15 DIAGNOSIS — Z51.81 ENCOUNTER FOR THERAPEUTIC DRUG MONITORING: Primary | ICD-10-CM

## 2025-01-15 PROCEDURE — 98005 SYNCH AUDIO-VIDEO EST LOW 20: CPT | Performed by: NURSE PRACTITIONER

## 2025-01-15 RX ORDER — BUPROPION HYDROCHLORIDE 150 MG/1
150 TABLET ORAL EVERY MORNING
Qty: 30 TABLET | Refills: 3 | Status: SHIPPED | OUTPATIENT
Start: 2025-01-15

## 2025-01-15 RX ORDER — TIRZEPATIDE 15 MG/.5ML
15 INJECTION, SOLUTION SUBCUTANEOUS WEEKLY
Qty: 6 ML | Refills: 0 | Status: SHIPPED | OUTPATIENT
Start: 2025-01-15

## 2025-01-15 RX ORDER — NALTREXONE HYDROCHLORIDE 50 MG/1
25 TABLET, FILM COATED ORAL
Qty: 15 TABLET | Refills: 3 | Status: SHIPPED | OUTPATIENT
Start: 2025-01-15

## 2025-01-15 NOTE — PROGRESS NOTES
[facility] Weight Management follow up via video visit:    Subjective    This visit is conducted using Telemedicine with live, interactive video and audio.    Chief Complaint:  Routine follow up visit for lifestyle and medical management for overweight, obesity, or morbid obesity. LOV in clinic weight: 232#.    PMH reviewed. Bariatric surgery planned or hx of surgery in the past: no.    HPI:   Shannon Wheatley is a 34 year old female who is being followed up today for lifestyle and medical management as deemed appropriate for overweight, obesity or morbid obesity. Patient reports weight loss monitoring at home via scale with a weight of 235#. This appears to be a weight gain since LOV 5 months ago in clinic. Patient has been consistent with medication and tolerating full dose Zepbound without SEs.    Questions/Concerns/Comments since LOV: Stopped tracking nutrition and noticed this contributed to weight regain. Was down to 226# with tracking. Increased stress of recent which contributed to stress eating. Would be interested in adding additional medication for support. Labs completed by PCP- reviewed in EMR from 12/2024.    Lifestyle/Social Hx Reviewed:    [facility] Medical Weight Loss Follow Up    Question 1/13/2025  5:24 PM CST - Filed by Patient   Please describe a success moment: Have continued exercising   Please describe a challenging moment/needs for improvement: Stopped logging food, went up in weight aince last visit   Please complete this 24 hour food journal, listing everything you had to eat in the past day. Include the average time of day you ate these meals at    List foods, qty and prep for breakfast: 2 breakfast tacos (eggs, barbacoa, tortilla) cauliflour rice   List foods, qty and prep for lunch. Rot chicken salad   List foods, qty and prep for dinner. Lasagna and asparagus   List foods, qty and prep for snacks. 6 gummy worm, chicolate pecan cluatwr   List the types and qty of fluids consumed 64   On  average, how many meals did you eat out per week? 1   Exercise    How many days per week are you active or exercise 3   On average, how many days were anaerobic (strength/resistance) exercises performed? 1   On average, how many days were aerobic (cardio) exercises performed? 3   Perceived level of exertion on a scale of 1-5, with 5 being very intense: 3   Stress    Average stress level on a scale of 1-10, with 10 being extremely stressed: 3   If greater than 5/1O how would you grade your coping mechanisms? moderate   Sleep hours and integrity    How many hours of uninterrupted sleep do you get a night: 7   Do you feel rested in the morning: Yes   If no, what may have been disrupting your sleep?    Please list any goal(s) for your next visit Go back down in weight, get resistance band exercise routine going     ROS  General: feeling well, denies fatigue  EYES: denies vision changes or high pain/pressure.  CV: denies cp, palpitations  Resp: denies sob  GI: denies abdominal pain. Denies N/V/D/C  Neuro: denies paresthesia or cognitive changes  Psych: denies any mood changes    Physical Exam:  >   BP Readings from Last 3 Encounters:   12/10/24 114/79   11/07/24 127/85   08/07/24 110/77       Home weight: see above   Home BP: not available  Home blood sugars: n/a    General: patient speaking in full sentences, no increased work of breathing. alert, appears stated age, cooperative, no distress, and morbidly obese  HENT: normocephalic  Resp: Breathing is non labored  Psych: patient appears cheerful, smiling, making good eye contact    Diagnoses and all orders for this visit:    Encounter for therapeutic drug monitoring  -     Tirzepatide-Weight Management (ZEPBOUND) 15 MG/0.5ML Subcutaneous Solution Auto-injector; Inject 15 mg into the skin once a week.  -     buPROPion  MG Oral Tablet 24 Hr; Take 1 tablet (150 mg total) by mouth every morning.  -     naltrexone 50 MG Oral Tab; Take 0.5 tablets (25 mg total) by mouth  daily with food. For cravings    Class 3 severe obesity due to excess calories without serious comorbidity with body mass index (BMI) of 50.0 to 59.9 in adult (HCC)  Comments:  Baseline BMI 50.78  Orders:  -     Tirzepatide-Weight Management (ZEPBOUND) 15 MG/0.5ML Subcutaneous Solution Auto-injector; Inject 15 mg into the skin once a week.  -     buPROPion  MG Oral Tablet 24 Hr; Take 1 tablet (150 mg total) by mouth every morning.  -     naltrexone 50 MG Oral Tab; Take 0.5 tablets (25 mg total) by mouth daily with food. For cravings    Prediabetes  -     Tirzepatide-Weight Management (ZEPBOUND) 15 MG/0.5ML Subcutaneous Solution Auto-injector; Inject 15 mg into the skin once a week.        Patient Instructions   Continue making lifestyle changes that focus on good nutrition, regular exercise and stress management.    Medication Plan: Continue Zepbound at full maintenance dose of 15 mg weekly. Start generic alternative to Contrave ([email or web address].com) with Naltrexone and Bupropion XL. Option to increase dose after 1 month if <5# weight loss, send MyChart status with current weight at the time if needed. Please do not take Naltrexone in combination with any opioid based pain medications as the effect of the pain medication will likely be blocked by Naltrexone. It is safe to take Naltrexone with an over the counter pain medication, if needed, as this will not be affected.    Next steps to work on before next office visit include: Great job in getting back on track!     I recommend self monitoring to support behavior change and to learn how your environment individually impacts YOUR body. This will help promote intuitive eating practices. Goal is to track nutrition 2x/week via paper log, Glaukos Elder or LoseIT! Elder, and log weekly home scale weight. Support via our counseling and dietician teams are available with a referral. Please let me know if this individualized care is desired.      Self-Monitoring - The Way to  Successful Weight Management    By Sara Blair RD, EVELIO, Dionne Randle RD, EVELIO, Ulices Rose PA-C, and Keerthi Noe MD    OAC [email or web address] Winter 2008 Resource    One major and possibly most important behavioral interventional strategy for weight management and lifestyle change is self-monitoring. Behavioral interventions are a central aspect in treatments to promote lifestyle changes that lead to weight-loss, prevent weight gain or weight regain and improve physical fitness. In the past, self-monitoring has unfortunately been one of the least popular techniques for those in weight management programs, and in some cases it is even thought of as a punishment. Because self-monitoring is critical for success with lifestyle changes, it is important to look at the various self monitoring techniques.    What is Self-monitoring?  Self-monitoring refers to the observing and recording of eating and exercise patterns, followed by feedback on the behaviors. The goal of self-monitoring is to increase self-awareness of target behaviors and outcomes, thus it can serve as an early warning system if problems are arising and can help track success. Some commonly used self-monitoring techniques include:    Food diaries  Regular self-weighing  Exercise logs  Equipment such as pedometers, accelerometers and metabolic devices  Food Logs and Diaries  One of the most common and important types of self-monitoring strategies in weight management programs is keeping a food log, in which individuals record foods, exercises or beverages as soon as they are consumed.    One important technique with food logs is individuals recording what they eat or drink as it is consumed; otherwise it may not give an accurate account of the day’s intake. A good “rule of thumb” for food logs is: “if you bite it, you write it!”    The minimum information for weight-loss that should be kept in food logs is type, amount and caloric content of food  or beverage consumed. This provides the ability to track and balance the number of calories consumed throughout the day with the amount of calories expended throughout the day.    Other nutritional information that can be logged includes: time of day of eating, fat content and carbohydrate grams. Disease-specific food logs can also be kept. For example: focusing on carbohydrate content instead of calories for patients with diabetes or insulin resistance.    Food Diaries  Another helpful tool in self-monitoring is keeping a food diary. Food diaries differ from food logs because they include more detailed information. They are useful if you are trying to find behavioral reasons or psychological aspects for eating.    Depending on the person and behavioral complexities involved, some food diaries could include the stress level, mood or feelings surrounding eating, activity or location or other environmental or emotional triggers for eating. The more complex or detailed, the better the feedback.    However, in today’s society it is almost impossible for most people to keep highly detailed daily food records over the long-term, therefore, compliance is often very low with detailed food diaries. By suggesting that patients keep a detailed food record for a few days each week, perhaps major areas of focus for nutritional and behavioral intervention can be recognized.    Logging Your Food Online  Online food logs and diaries or computer software are quick and convenient ways to keep records of foods consumed in our technologically advanced world. Many Web sites are available for tracking of foods and calories throughout the day, some of which are free and very easy to use.    You can look up food choices and/or alternative choices in online databases of more than 50,000 foods. Internet-savvy loggers may choose to keep their journals online. Others may just choose to use these databases as a more convenient way of looking up  nutritional value of foods. Some free online diaries include:    Free Web sites for searching nutritional information are available, an example is www.Huoli.Split. These Web sites may also offer exercise tracking and ideas, support, motivational tips and chat or discussion rooms.    Hand-held Calorie Counters  Another option for those who are “on the go” are handheld devices for calorie counting. Some of the devices are stand-alone such as CalorieSmart® or HealthFitCounter®. Others need to connect to Web sites. Other devices are installed in your Palm or Pocket-PC such as Diet Diary by [employer]. They let you download updates when nutrition facts change, however, some of them use a lot of memory.    Regular Weighing  Weighing yourself is an important and simple self-monitoring behavior to serve as reminder of one’s eating and physical activity habits. Although it may be hard and sometimes discouraging to weigh yourself while losing weight, it is recommended to weigh yourself weekly, preferably outside of the home on the same scale.    Using the scale at the local gym or exercise facility or your doctor’s office may be more accurate than home scales. However, if this is unrealistic, it is okay to use a home scale. Try to weigh yourself at the same time of day and the same day of the week.    Writing down your weekly weights on a table, graph or calendar can help you keep track of your success or to help you get back on track more quickly. It is important to note that weighing yourself more frequently than weekly is not recommended, as day to day fluctuations are not indicators of actual weight. Regular monitoring of your weight is also essential to help you maintain your weight after losing weight.    Exercise Logs  Another self-monitoring technique, along the same lines as food logs and diaries, is keeping an exercise log or diary. The number of minutes engaged and type and level of exertion of physical  activity should ideally be recorded.    An important and often forgotten aspect of exercise logs is the level of perceived exertion. Walking for 30 minutes, at an easy compared to a hard pace, will result in different levels of calories burned and cardiovascular impact.    Typically, an easy physical activity that does not increase heart rate much, or alter breathing would usually be the pace that you walk around work or go shopping. Moderate level of physical exertion is when you are getting a mildly increased heart and breathing rate. Heavy or hard level of physical exertion would be sweating, increased heart rate (target heart rate range) as well as increased breathing.    Remember, physical activity can be done at one time or intermittently throughout the day. Logging exercise can be a positive feedback or a reminder to incorporate more exercise or physical activity into your daily routine.    Initial activities may be walking, riding a stationary bike or swimming at a slow pace. Other types of exercise that can be fun are dancing, exercise videos or chair exercises. You should try to aim for 30 minutes of exercise on most days of the week.    Many people try to start out with exercise on three or four days of the week. However, if you can get yourself exercising most to all days of the week, even if only for 10 or 15 minutes, it will become more of a routine for you.    Healthy Lifestyle Tip  All adults should set a long-term goal to accumulate at least 30 minutes or more of moderate-intensity physical activity on most to all days of the week. Also, try to increase activities of daily living such as taking the stairs instead of the elevator, parking further away or walking to a bathroom that is further from your desk. Reducing sedentary time is a good strategy to increase activity by undertaking frequent, less strenuous activities. With time, you may be able to engage in more strenuous  activities.    Pedometers  Self-monitoring tools are becoming more and more popular and accurate. One of the simplest of these self-monitoring tools is a pedometer. Pedometers give objective data of physical activity throughout the day. Pedometers can be found in almost any consumer catalog or retail store.    Some of the more popular manufactures include Digi-Walker, [employer]Care[employer], AcVatgia.comn, Munogenics, Ouner, Touchtalent, Freestyle, Munogenics, Uniquedu and many others. Somewhere and [employer] make pedometer of speedometer devices that calculate steps and speed using GPS. These clip-on devices are inexpensive, ranging from less than $15 up to $75.    Many people get an average of 3,000 steps per day with daily activity. In order to burn off extra calories for weight-loss, walking 10,000 steps per day is recommended. For regular health, a minimum of 6,000 steps per day is required. Research suggests that a deliberate walk of 4,000-6,000 steps will help with weight-loss. It is often a good idea to keep track of your daily steps taken in your exercise log.    Pedometers can be frustrating for those who are more interested in distance traveled. Focusing on the number of steps and ways to incorporate more steps throughout the day will make as much of a difference with weight-loss as actual distance does. Pedometers encourage people to find ways to add more steps throughout the day.    Because step counting is becoming more popular, advances are being made in the technology behind pedometers. New pedometers display steps and count them accurately. They are meant to be worn everyday and all day, as motivation to keep stepping, Most are small and comfortable to wear.    Pedometers sense your body motion, counting your footsteps usually by a turned pendulum technology, a coiled spring mechanism and a hairspring mechanism (which is the least accurate). The unit should be accurate in its count when you wear it correctly. You may  need to experiment with where to wear it. You can measure your stride and then the pedometer can estimate distance traveled.    Some pedometers today offer multifunction options like calorie estimates, clocks, timers, stopwatches, speed estimators, seven-day memory or pulse rate readers, voice feedback and radios.    Accelerometers  Although pedometers are very cost-effective, one of the main flaws in using pedometers, however, is that they do not record intensity (how hard) or duration (how long) or frequency (how often) movement occurs. Accelerometers are devices that can objectively measure frequency, duration and intensity of physical activity.    Accelerometers provide a high level of accuracy when assessing physical activity. There are a variety of commercially available accelerometers, or activity monitors, which come in a wide range of prices anywhere from $50 to $1,000. E-Buyrainer and Nike are examples of affordable accelerometers.    Many of the more expensive accelerometers are used only in research or as a part of a hospital-based program. These monitors are more complex than pedometers in that they display and store more complex data. Some are designed to download to a computer for analysis of intensity levels, movements and physical activity patterns. They can also be used to estimate calories burned or energy expenditure.    Accelerometers have sophisticated sensors that convert physical movement into an electrical signal that is relative to the muscular force needed to produce the work. Accelerometers can be found in uniaxial or triaxial measures. Uniaxial accelerometers measure in a single plane and can be attached to the trunk or limbs. Triaxial accelerometers measure along three planes: vertical, medial-lateral and anterior-posterior.    Although accelerometers are a step up from pedometers in accuracy of physical activity, they cannot register resistance. Therefore, if you are strength training  or adding resistance to your bike or treadmill or adding an incline to your walking, it will not be able to discern the added level of energy required to do that work.    Metabolic Devices  One of the most accurate and most expensive tools for self-monitoring are tools that have very sophisticated monitoring and interpreting sensors for calories burned. Many of these devices have options to subscribe to a Web-based calorie counter system that integrates the amount of calories burned measured by the equipment and your calories consumed that you enter in easy to use food logs.    These devices are more accurate in measurements of calories expended because they use not only accelerometer technology, but also heat flux sensors, galvanic skin response (to measure physical exertion and emotional stimuli) and skin temperature gauges. Some also include heart rate monitoring techniques. All of these technologies combined lead to a very accurate measurement of calories expended throughout the day.    These devices can determine if you are sitting, sleeping, jogging, walking, lifting weights or riding in a car. Many of these devices are very expensive and used primarily for research, however, some are available commercially.    This technology is also employed by hospital-based programs. Patients wear the hospital’s armband and track their nutrition on the Web site or computer-based program for typically one to two weeks. When they return to the clinic, the information will be uploaded and the practitioners will be able to work with the patients with objective data on metabolic lifestyle patterns.    Practitioners can also monitor patients on integrated software applications to provide consultations without being face to face. Practitioners have the ability to set daily goals to tailor programs to the individual patient. These are great tools to help objectively monitor behavior and physical activity, as well as providing real  time feed back to the patient. [employer] is one company that offers this technology.    Conclusion  Although specific diseases and treatments vary, behavior modification is the major key in weight-loss or prevention and decreases the risk of diseases. Self-monitoring is a key to behavior modifications, and there are a multitude of ways to self-monitor. With technology advancements, self-monitoring techniques are changing and improving to help defeat some of the major barriers to compliance. The bottom line is that no matter how you do it, self-monitoring should be an important part of your weight-loss, weight maintenance or healthy lifestyle change. Then, the next step is to be sure the self-monitoring translates into positive behavior changes with regards to diet and exercise.      Mindful eating takes practice to build a life long habit. Often we want to eat but not for true hunger, rather it's triggered by emotional/physical or environmental reasons. Check out www.[employer] website for tools and tips as well as an meg for daily support. Beyond these tools counseling can be an option to help support behavior change.    Get In Touch With Your Appetite- Hunger Cues  There are many signals that tell us it's time to eat (other than a rumbling stomach): television ads, social events, smells from the food court, and the candy bowl at the office. These factors in the environment trigger our senses and other mental processes that make us think we are hungry even when we’re not.  **The Hunger Rating Scale can help you decide if you are experiencing real hunger.  Remember that physical hunger builds gradually over time (usually over several hours after a meal), whereas emotional eating and cravings usually come on very suddenly. When you are genuinely hungry, you may experience one or several of the symptoms listed below:  Stomach pangs or growling   Emptiness in the stomach   Irritability   Headache   Low  energy/fatigue   Difficulty concentrating  How Does the Scale Work?  Before you eat, take a moment to rate your hunger. Think about how hungry you physically feel. Your goal is to eat between levels 4 and 6. This means you are eating when you are hungry but stopping when you are comfortably full.  Try not to put off eating for too long. Waiting until level 1 or 2 -- when you are starving and unable to concentrate -- may lead to overeating. When you first start to feel any of the symptoms listed above, you should probably start to think about eating.  We often let the sight of food tempt us when we are above a level 6 on the scale. Before you indulge, take a step back and think about how you feel. Did you just eat a few minutes ago? Are you eating in response to an emotion or because you are experiencing physical hunger?  Think of alternatives to eating for when these temptations arise. Some ideas are:  Drink a glass of cold water or another zero-calorie beverage   Take a walk to change the scenery   Do another form of exercise (sit-ups, running, swimming, tennis, etc.)   Call/text a friend or family member   Play a game with someone else  **   Hunger-Satiety Rating Scale    Full - 10        10 = Stuffed to the point of feeling sick   9 = Very uncomfortably full, need to loosen your belt    8 = Uncomfortably full, feel stuffed    7 = Very full, feel as if you have overeaten    6 = Comfortably full, satisfied    Neutral - 5    5 = Comfortable, neither hungry nor full   4 = Beginning signs and symptoms of hunger    3 = Hungry with several hunger symptoms, ready to eat    2 = Very hungry, unable to concentrate    Hungry - 1    1 = Starving, dizzy, irritable     ___________________________________________________________________  Taken from the American Diabetes Association @ www.diabetes.org.  Last Edited: March 19, 2014, reviewed December 17, 2013    Emotional Eating and Overeating   You have to know how to stop  emotional eating and stop overeating if you want to lose weight and keep it off successfully.  Emotional eating and overeating can’t really satisfy an insatiable appetite anyway.  And whether or not you’ve been trying to use emotional eating to soothe feelings of stress, depression, loneliness, frustration or boredom, in the long run, overeating to feed those feelings only makes them worse.  But learning how to stop overeating and control emotional eating can support healthy permanent weight loss and make you feel powerful.  Stop Emotional Eating - Don’t Use Foods to Soothe Moods  You probably already know that overeating high-fat, high-calorie, sweet, salty and unhealthy bad carbs won’t fill that empty void inside for long.  But what can you do to learn how to stop emotional overeating?  Most of us learn emotional eating at a very young age. We get into the habit of using food to sooth stressful feelings, alleviate boredom, reward and comfort ourselves, boost our sprits and celebrate with others.  But even though almost everyone’s overeating, you don’t have to. If you’re ready to take that old-frenzied feed-your-feelings bull by the eduardo, here’s our basic 12 step program for how to stop emotional eating.  1. Make a commitment. Like any established bad habit, nothing will change unless you make a commit to changing your behavior.  2. Practice awareness. To be more conscious of what’s happening, jot down when and what you eat and how you feel before and afterwards.  3. Manage your stress. Healthy emotional distress management is an important life skill. Positive ways to reduce stress include regular exercise, relaxation techniques and getting support from family and friends.  4. Be physically active. Exercise reduces stress and is a great mood enhancer too. So be sure you make time for regular physical activity.  5. Create new comforts. Make a list of healthy activities you enjoy. And, whenever you feel the need,  treat yourself to something on your list.  6. Start eating healthier. When you eat for health you’ll choose more high fiber foods, such as vegetables, beans, whole grains and fresh fruits, plus healthy high protein foods, like fish, lean poultry and low-fat dairy.  7. Eat mini-meals often. By eating 5 or 6 small healthy meals a day, including breakfast, you help keep your blood sugar and moods stable.  8. Get rid of temptations. Don’t keep unhealthy food in the house, don’t shop for food when hungry or stressed and plan ahead before eating out.  9. Get enough sleep. When you’re tired, it’s easier to give in to emotional eating. Consider taking a nap and be sure to get a good nights sleep.  10. Use healthy distraction. Instead of overeating, take a walk, surf the Internet, pet your cat or dog, listen to music, enjoy a warm bath, read a good book, watch a movie, work in the garden or talk to a friend.  11. Practice mindfulness. Mindful eating means paying attention to the act of eating and observing your thoughts and feelings in the process.  12. Get some support. It’s easier to control emotional eating if you have a support network of friends or family. And if no one you know is supportive, make some new health-oriented friends or join a support group.  Learning how to stop emotional eating and overeating is a life-changing experience. Just make sure to stay on track and enjoy the journey.    Posted By Valerio Lockett On December 27, 2015 @ 11:33 am In Healthy Living. Taken from www.[employer]      Mindful Eating Tips:  When we sit down to a meal by ourselves or with friends, it's easy to zone out and disconnect from our bodies. But, if you approach eating a meal with the intent to stay mindful and present, you will be able to enjoy yourself and walk away from the table feeling good about yourself and your choices. Here are several tips to keep in mind when it comes to food and eating.    Choose for yourself:  Do not get hung up on what other people are eating. Instead, ask yourself what you would like to eat.    Forget about good and bad: Remind yourself that foods fall on a nutritional continuum (high value/low value), not on a moral continuum (good/bad).    Stay clear of guilt or shame: Refrain from allowing guilt or shame to contaminate your eating decisions. Avoid secret eating and get clear on who you are really hiding from if you eat in secret.    Choose foods that you like: Do not eat foods that you do not find satisfying or enjoyable. Eating that way will make you feel like you are on a diet.    Look before you eat: Before you eat, look at your food, its portion size, and presentation. Breathe deeply. Look again before taking a mouthful.    Chew every mouthful: Chewing a lot helps to thoroughly release the flavor of foods. Let food sit on your tongue. This allows your taste buds to absorb the flavor and transmit messages about your appetite to your brain.    Talk or eat: When you are talking, stop eating. When you are eating, stop talking.    Stay connected: Pay attention to your body's appetite signals while you are eating.    Pause while you are eating: Think about how you are feeling about your food in terms of quality and quantity.    Know when to stop eating: Stop eating when flavor intensity declines, as it is bound to do. Do not try to polish off all of the food in front of you. Instead, aim for the moment when flavor peaks and you feel an internal “ah” of satisfaction--then stop.    Evaluate how full you are: Keep asking yourself while you are eating, “Am I still hungry?” and “Am I satisfied?”    by [facility]  and       Return in about 4 months (around 5/15/2025) for weight management via clinic as scheduled.    DOCUMENTATION OF TIME SPENT: Code selection for this visit was based on time spent : 20 minutes on date of service in preparing to see the patient, obtaining  and/or reviewing separately obtained history, performing a medically appropriate examination, counseling and educating the patient/family/caregiver, ordering medications or testing, referring and communicating with other healthcare providers, documenting clinical information in the electronic medical record, independently interpreting results and communicating results to the patient/family/caregiver and care coordination with the patient's other providers.    Answers submitted by the patient for this visit:  Medical Weight Loss Follow Up (Submitted on 1/13/2025)  If greater than 5/1O how would you grade your coping mechanisms?: moderate

## 2025-01-16 NOTE — PATIENT INSTRUCTIONS
Continue making lifestyle changes that focus on good nutrition, regular exercise and stress management.    Medication Plan: Continue Zepbound at full maintenance dose of 15 mg weekly. Start generic alternative to Contrave ([email or web address]) with Naltrexone and Bupropion XL. Option to increase dose after 1 month if <5# weight loss, send MyChart status with current weight at the time if needed. Please do not take Naltrexone in combination with any opioid based pain medications as the effect of the pain medication will likely be blocked by Naltrexone. It is safe to take Naltrexone with an over the counter pain medication, if needed, as this will not be affected.    Next steps to work on before next office visit include: Great job in getting back on track!     I recommend self monitoring to support behavior change and to learn how your environment individually impacts YOUR body. This will help promote intuitive eating practices. Goal is to track nutrition 2x/week via paper log, [employer] Elder or [employer], and log weekly home scale weight. Support via our counseling and dietician teams are available with a referral. Please let me know if this individualized care is desired.      Self-Monitoring - The Way to Successful Weight Management    By Sara Blair RD, LDN, Dionne Randle RD, LDN, Ulices Rose PA-C, and Keerthi Noe MD    [facility] [email or web address] Winter 2008 Resource    One major and possibly most important behavioral interventional strategy for weight management and lifestyle change is self-monitoring. Behavioral interventions are a central aspect in treatments to promote lifestyle changes that lead to weight-loss, prevent weight gain or weight regain and improve physical fitness. In the past, self-monitoring has unfortunately been one of the least popular techniques for those in weight management programs, and in some cases it is even thought of as a punishment. Because self-monitoring is critical for  success with lifestyle changes, it is important to look at the various self monitoring techniques.    What is Self-monitoring?  Self-monitoring refers to the observing and recording of eating and exercise patterns, followed by feedback on the behaviors. The goal of self-monitoring is to increase self-awareness of target behaviors and outcomes, thus it can serve as an early warning system if problems are arising and can help track success. Some commonly used self-monitoring techniques include:    Food diaries  Regular self-weighing  Exercise logs  Equipment such as pedometers, accelerometers and metabolic devices  Food Logs and Diaries  One of the most common and important types of self-monitoring strategies in weight management programs is keeping a food log, in which individuals record foods, exercises or beverages as soon as they are consumed.    One important technique with food logs is individuals recording what they eat or drink as it is consumed; otherwise it may not give an accurate account of the day’s intake. A good “rule of thumb” for food logs is: “if you bite it, you write it!”    The minimum information for weight-loss that should be kept in food logs is type, amount and caloric content of food or beverage consumed. This provides the ability to track and balance the number of calories consumed throughout the day with the amount of calories expended throughout the day.    Other nutritional information that can be logged includes: time of day of eating, fat content and carbohydrate grams. Disease-specific food logs can also be kept. For example: focusing on carbohydrate content instead of calories for patients with diabetes or insulin resistance.    Food Diaries  Another helpful tool in self-monitoring is keeping a food diary. Food diaries differ from food logs because they include more detailed information. They are useful if you are trying to find behavioral reasons or psychological aspects for  eating.    Depending on the person and behavioral complexities involved, some food diaries could include the stress level, mood or feelings surrounding eating, activity or location or other environmental or emotional triggers for eating. The more complex or detailed, the better the feedback.    However, in today’s society it is almost impossible for most people to keep highly detailed daily food records over the long-term, therefore, compliance is often very low with detailed food diaries. By suggesting that patients keep a detailed food record for a few days each week, perhaps major areas of focus for nutritional and behavioral intervention can be recognized.    Logging Your Food Online  Online food logs and diaries or computer software are quick and convenient ways to keep records of foods consumed in our technologically advanced world. Many Web sites are available for tracking of foods and calories throughout the day, some of which are free and very easy to use.    You can look up food choices and/or alternative choices in online databases of more than 50,000 foods. Internet-savvy loggers may choose to keep their journals online. Others may just choose to use these databases as a more convenient way of looking up nutritional value of foods. Some free online diaries include:    Free Web sites for searching nutritional information are available, an example is www.[employer]. These Web sites may also offer exercise tracking and ideas, support, motivational tips and chat or discussion rooms.    Hand-held Calorie Counters  Another option for those who are “on the go” are handheld devices for calorie counting. Some of the devices are stand-alone such as CalorieSmart® or HealthFitCounter®. Others need to connect to Web sites. Other devices are installed in your Palm or Pocket-PC such as Diet Diary by [employer]. They let you download updates when nutrition facts change, however, some of them use a lot of  memory.    Regular Weighing  Weighing yourself is an important and simple self-monitoring behavior to serve as reminder of one’s eating and physical activity habits. Although it may be hard and sometimes discouraging to weigh yourself while losing weight, it is recommended to weigh yourself weekly, preferably outside of the home on the same scale.    Using the scale at the local gym or exercise facility or your doctor’s office may be more accurate than home scales. However, if this is unrealistic, it is okay to use a home scale. Try to weigh yourself at the same time of day and the same day of the week.    Writing down your weekly weights on a table, graph or calendar can help you keep track of your success or to help you get back on track more quickly. It is important to note that weighing yourself more frequently than weekly is not recommended, as day to day fluctuations are not indicators of actual weight. Regular monitoring of your weight is also essential to help you maintain your weight after losing weight.    Exercise Logs  Another self-monitoring technique, along the same lines as food logs and diaries, is keeping an exercise log or diary. The number of minutes engaged and type and level of exertion of physical activity should ideally be recorded.    An important and often forgotten aspect of exercise logs is the level of perceived exertion. Walking for 30 minutes, at an easy compared to a hard pace, will result in different levels of calories burned and cardiovascular impact.    Typically, an easy physical activity that does not increase heart rate much, or alter breathing would usually be the pace that you walk around work or go shopping. Moderate level of physical exertion is when you are getting a mildly increased heart and breathing rate. Heavy or hard level of physical exertion would be sweating, increased heart rate (target heart rate range) as well as increased breathing.    Remember, physical  activity can be done at one time or intermittently throughout the day. Logging exercise can be a positive feedback or a reminder to incorporate more exercise or physical activity into your daily routine.    Initial activities may be walking, riding a stationary bike or swimming at a slow pace. Other types of exercise that can be fun are dancing, exercise videos or chair exercises. You should try to aim for 30 minutes of exercise on most days of the week.    Many people try to start out with exercise on three or four days of the week. However, if you can get yourself exercising most to all days of the week, even if only for 10 or 15 minutes, it will become more of a routine for you.    Healthy Lifestyle Tip  All adults should set a long-term goal to accumulate at least 30 minutes or more of moderate-intensity physical activity on most to all days of the week. Also, try to increase activities of daily living such as taking the stairs instead of the elevator, parking further away or walking to a bathroom that is further from your desk. Reducing sedentary time is a good strategy to increase activity by undertaking frequent, less strenuous activities. With time, you may be able to engage in more strenuous activities.    Pedometers  Self-monitoring tools are becoming more and more popular and accurate. One of the simplest of these self-monitoring tools is a pedometer. Pedometers give objective data of physical activity throughout the day. Pedometers can be found in almost any consumer catalog or retail store.    Some of the more popular manufactures include Digi-Walker, FindProzron, SpaceCurve, [employer], [employer], Digital H2O, Freestyle, LOVE[employer]LM, [employer] and many others. [employer] and [employer] make pedometer of speedometer devices that calculate steps and speed using GPS. These clip-on devices are inexpensive, ranging from less than $15 up to $75.    Many people get an average of 3,000 steps per day with daily activity. In  order to burn off extra calories for weight-loss, walking 10,000 steps per day is recommended. For regular health, a minimum of 6,000 steps per day is required. Research suggests that a deliberate walk of 4,000-6,000 steps will help with weight-loss. It is often a good idea to keep track of your daily steps taken in your exercise log.    Pedometers can be frustrating for those who are more interested in distance traveled. Focusing on the number of steps and ways to incorporate more steps throughout the day will make as much of a difference with weight-loss as actual distance does. Pedometers encourage people to find ways to add more steps throughout the day.    Because step counting is becoming more popular, advances are being made in the technology behind pedometers. New pedometers display steps and count them accurately. They are meant to be worn everyday and all day, as motivation to keep stepping, Most are small and comfortable to wear.    Pedometers sense your body motion, counting your footsteps usually by a turned pendulum technology, a coiled spring mechanism and a hairspring mechanism (which is the least accurate). The unit should be accurate in its count when you wear it correctly. You may need to experiment with where to wear it. You can measure your stride and then the pedometer can estimate distance traveled.    Some pedometers today offer multifunction options like calorie estimates, clocks, timers, stopwatches, speed estimators, seven-day memory or pulse rate readers, voice feedback and radios.    Accelerometers  Although pedometers are very cost-effective, one of the main flaws in using pedometers, however, is that they do not record intensity (how hard) or duration (how long) or frequency (how often) movement occurs. Accelerometers are devices that can objectively measure frequency, duration and intensity of physical activity.    Accelerometers provide a high level of accuracy when assessing  physical activity. There are a variety of commercially available accelerometers, or activity monitors, which come in a wide range of prices anywhere from $50 to $1,000. [employer] and [employer] are examples of affordable accelerometers.    Many of the more expensive accelerometers are used only in research or as a part of a hospital-based program. These monitors are more complex than pedometers in that they display and store more complex data. Some are designed to download to a computer for analysis of intensity levels, movements and physical activity patterns. They can also be used to estimate calories burned or energy expenditure.    Accelerometers have sophisticated sensors that convert physical movement into an electrical signal that is relative to the muscular force needed to produce the work. Accelerometers can be found in uniaxial or triaxial measures. Uniaxial accelerometers measure in a single plane and can be attached to the trunk or limbs. Triaxial accelerometers measure along three planes: vertical, medial-lateral and anterior-posterior.    Although accelerometers are a step up from pedometers in accuracy of physical activity, they cannot register resistance. Therefore, if you are strength training or adding resistance to your bike or treadmill or adding an incline to your walking, it will not be able to discern the added level of energy required to do that work.    Metabolic Devices  One of the most accurate and most expensive tools for self-monitoring are tools that have very sophisticated monitoring and interpreting sensors for calories burned. Many of these devices have options to subscribe to a Web-based calorie counter system that integrates the amount of calories burned measured by the equipment and your calories consumed that you enter in easy to use food logs.    These devices are more accurate in measurements of calories expended because they use not only accelerometer technology, but also heat flux  sensors, galvanic skin response (to measure physical exertion and emotional stimuli) and skin temperature gauges. Some also include heart rate monitoring techniques. All of these technologies combined lead to a very accurate measurement of calories expended throughout the day.    These devices can determine if you are sitting, sleeping, jogging, walking, lifting weights or riding in a car. Many of these devices are very expensive and used primarily for research, however, some are available commercially.    This technology is also employed by hospital-based programs. Patients wear the hospital’s armband and track their nutrition on the Web site or computer-based program for typically one to two weeks. When they return to the clinic, the information will be uploaded and the practitioners will be able to work with the patients with objective data on metabolic lifestyle patterns.    Practitioners can also monitor patients on integrated software applications to provide consultations without being face to face. Practitioners have the ability to set daily goals to tailor programs to the individual patient. These are great tools to help objectively monitor behavior and physical activity, as well as providing real time feed back to the patient. [employer] is one company that offers this technology.    Conclusion  Although specific diseases and treatments vary, behavior modification is the major key in weight-loss or prevention and decreases the risk of diseases. Self-monitoring is a key to behavior modifications, and there are a multitude of ways to self-monitor. With technology advancements, self-monitoring techniques are changing and improving to help defeat some of the major barriers to compliance. The bottom line is that no matter how you do it, self-monitoring should be an important part of your weight-loss, weight maintenance or healthy lifestyle change. Then, the next step is to be sure the self-monitoring translates  into positive behavior changes with regards to diet and exercise.      Mindful eating takes practice to build a life long habit. Often we want to eat but not for true hunger, rather it's triggered by emotional/physical or environmental reasons. Check out www.[employer] website for tools and tips as well as an meg for daily support. Beyond these tools counseling can be an option to help support behavior change.    Get In Touch With Your Appetite- Hunger Cues  There are many signals that tell us it's time to eat (other than a rumbling stomach): television ads, social events, smells from the food court, and the candy bowl at the office. These factors in the environment trigger our senses and other mental processes that make us think we are hungry even when we’re not.  **The Hunger Rating Scale can help you decide if you are experiencing real hunger.  Remember that physical hunger builds gradually over time (usually over several hours after a meal), whereas emotional eating and cravings usually come on very suddenly. When you are genuinely hungry, you may experience one or several of the symptoms listed below:  Stomach pangs or growling   Emptiness in the stomach   Irritability   Headache   Low energy/fatigue   Difficulty concentrating  How Does the Scale Work?  Before you eat, take a moment to rate your hunger. Think about how hungry you physically feel. Your goal is to eat between levels 4 and 6. This means you are eating when you are hungry but stopping when you are comfortably full.  Try not to put off eating for too long. Waiting until level 1 or 2 -- when you are starving and unable to concentrate -- may lead to overeating. When you first start to feel any of the symptoms listed above, you should probably start to think about eating.  We often let the sight of food tempt us when we are above a level 6 on the scale. Before you indulge, take a step back and think about how you feel. Did you just eat a few minutes  ago? Are you eating in response to an emotion or because you are experiencing physical hunger?  Think of alternatives to eating for when these temptations arise. Some ideas are:  Drink a glass of cold water or another zero-calorie beverage   Take a walk to change the scenery   Do another form of exercise (sit-ups, running, swimming, tennis, etc.)   Call/text a friend or family member   Play a game with someone else  **   Hunger-Satiety Rating Scale    Full - 10        10 = Stuffed to the point of feeling sick   9 = Very uncomfortably full, need to loosen your belt    8 = Uncomfortably full, feel stuffed    7 = Very full, feel as if you have overeaten    6 = Comfortably full, satisfied    Neutral - 5    5 = Comfortable, neither hungry nor full   4 = Beginning signs and symptoms of hunger    3 = Hungry with several hunger symptoms, ready to eat    2 = Very hungry, unable to concentrate    Hungry - 1    1 = Starving, dizzy, irritable     ___________________________________________________________________  Taken from the American Diabetes Association @ www.diabetes.org.  Last Edited: March 19, 2014, reviewed December 17, 2013    Emotional Eating and Overeating   You have to know how to stop emotional eating and stop overeating if you want to lose weight and keep it off successfully.  Emotional eating and overeating can’t really satisfy an insatiable appetite anyway.  And whether or not you’ve been trying to use emotional eating to soothe feelings of stress, depression, loneliness, frustration or boredom, in the long run, overeating to feed those feelings only makes them worse.  But learning how to stop overeating and control emotional eating can support healthy permanent weight loss and make you feel powerful.  Stop Emotional Eating - Don’t Use Foods to Soothe Moods  You probably already know that overeating high-fat, high-calorie, sweet, salty and unhealthy bad carbs won’t fill that empty void inside for long.  But  what can you do to learn how to stop emotional overeating?  Most of us learn emotional eating at a very young age. We get into the habit of using food to sooth stressful feelings, alleviate boredom, reward and comfort ourselves, boost our sprits and celebrate with others.  But even though almost everyone’s overeating, you don’t have to. If you’re ready to take that old-frenzied feed-your-feelings bull by the horns, here’s our basic 12 step program for how to stop emotional eating.  1. Make a commitment. Like any established bad habit, nothing will change unless you make a commit to changing your behavior.  2. Practice awareness. To be more conscious of what’s happening, jot down when and what you eat and how you feel before and afterwards.  3. Manage your stress. Healthy emotional distress management is an important life skill. Positive ways to reduce stress include regular exercise, relaxation techniques and getting support from family and friends.  4. Be physically active. Exercise reduces stress and is a great mood enhancer too. So be sure you make time for regular physical activity.  5. Create new comforts. Make a list of healthy activities you enjoy. And, whenever you feel the need, treat yourself to something on your list.  6. Start eating healthier. When you eat for health you’ll choose more high fiber foods, such as vegetables, beans, whole grains and fresh fruits, plus healthy high protein foods, like fish, lean poultry and low-fat dairy.  7. Eat mini-meals often. By eating 5 or 6 small healthy meals a day, including breakfast, you help keep your blood sugar and moods stable.  8. Get rid of temptations. Don’t keep unhealthy food in the house, don’t shop for food when hungry or stressed and plan ahead before eating out.  9. Get enough sleep. When you’re tired, it’s easier to give in to emotional eating. Consider taking a nap and be sure to get a good nights sleep.  10. Use healthy distraction. Instead of  overeating, take a walk, surf the Internet, pet your cat or dog, listen to music, enjoy a warm bath, read a good book, watch a movie, work in the garden or talk to a friend.  11. Practice mindfulness. Mindful eating means paying attention to the act of eating and observing your thoughts and feelings in the process.  12. Get some support. It’s easier to control emotional eating if you have a support network of friends or family. And if no one you know is supportive, make some new health-oriented friends or join a support group.  Learning how to stop emotional eating and overeating is a life-changing experience. Just make sure to stay on track and enjoy the journey.    Posted By Valerio Lockett On December 27, 2015 @ 11:33 am In Healthy Living. Taken from www.[employer]      Mindful Eating Tips:  When we sit down to a meal by ourselves or with friends, it's easy to zone out and disconnect from our bodies. But, if you approach eating a meal with the intent to stay mindful and present, you will be able to enjoy yourself and walk away from the table feeling good about yourself and your choices. Here are several tips to keep in mind when it comes to food and eating.    Choose for yourself: Do not get hung up on what other people are eating. Instead, ask yourself what you would like to eat.    Forget about good and bad: Remind yourself that foods fall on a nutritional continuum (high value/low value), not on a moral continuum (good/bad).    Stay clear of guilt or shame: Refrain from allowing guilt or shame to contaminate your eating decisions. Avoid secret eating and get clear on who you are really hiding from if you eat in secret.    Choose foods that you like: Do not eat foods that you do not find satisfying or enjoyable. Eating that way will make you feel like you are on a diet.    Look before you eat: Before you eat, look at your food, its portion size, and presentation. Breathe deeply. Look again before taking  a mouthful.    Chew every mouthful: Chewing a lot helps to thoroughly release the flavor of foods. Let food sit on your tongue. This allows your taste buds to absorb the flavor and transmit messages about your appetite to your brain.    Talk or eat: When you are talking, stop eating. When you are eating, stop talking.    Stay connected: Pay attention to your body's appetite signals while you are eating.    Pause while you are eating: Think about how you are feeling about your food in terms of quality and quantity.    Know when to stop eating: Stop eating when flavor intensity declines, as it is bound to do. Do not try to polish off all of the food in front of you. Instead, aim for the moment when flavor peaks and you feel an internal “ah” of satisfaction--then stop.    Evaluate how full you are: Keep asking yourself while you are eating, “Am I still hungry?” and “Am I satisfied?”    by Anamika Mancia [facility]  and

## 2025-05-13 NOTE — PROGRESS NOTES
Shannon Wheatley is a 34 year old female presents today for follow-up on medical weight loss program for the treatment of overweight, obesity, or morbid obesity with associated prediabetes.    S:  Current weight   Wt Readings from Last 6 Encounters:   05/14/25 226 lb (102.5 kg)   12/10/24 236 lb (107 kg)   11/07/24 233 lb (105.7 kg)   08/07/24 232 lb (105.2 kg)   12/13/23 242 lb (109.8 kg)   10/07/23 244 lb 9.6 oz (110.9 kg)    AND BMI Body mass index is 38.19 kg/m²..    Patient has lost 6# since LOV 4 months via VV and in clinic in 8/2024. She has been consistent with medication and tolerating added Nal/Bup XL without SEs.    Testing/consult completed since LOV: Dietician: 9/15/22 Lizabeth as NP consult @ [facility]#: Estimated caloric needs for weight loss: 2890-703=8260 cals/d for 1 pounds/week weight loss. F/u: 6/9/2023    Social hx and PMH reviewed. Employed as a  at [employer]. Single.    [facility] Medical Weight Loss Follow Up    Question 5/14/2025  7:26 AM CDT - Filed by Patient   Please describe a success moment: Got back to bike riding after not due to being sick and vacation   Please describe a challenging moment/needs for improvement: Have stopped keeping food diary, will start again   Please complete this 24 hour food journal, listing everything you had to eat in the past day. Include the average time of day you ate these meals at    List foods, qty and prep for breakfast: Egg frittata with potto, ham, [place], veggies, cheese   List foods, qty and prep for lunch. 3 wings, 4 potato wedges   List foods, qty and prep for dinner. None-not hungry   List foods, qty and prep for snacks. Popcorn, chocolate caramel   List the types and qty of fluids consumed Not sure, havent been logging   On average, how many meals did you eat out per week? 1   Exercise    How many days per week are you active or exercise 4   On average, how many days were anaerobic (strength/resistance) exercises performed? 0   On average, how  many days were aerobic (cardio) exercises performed? 4   Perceived level of exertion on a scale of 1-5, with 5 being very intense: 3   Stress    Average stress level on a scale of 1-10, with 10 being extremely stressed: 5   If greater than 5/1O how would you grade your coping mechanisms? moderate   Sleep hours and integrity    How many hours of uninterrupted sleep do you get a night: 7   Do you feel rested in the morning: Yes   If no, what may have been disrupting your sleep?    Please list any goal(s) for your next visit Logging food diary, consistant strength training       REVIEW OF SYSTEMS:  GENERAL: feels well otherwise  LUNGS: denies shortness of breath with exertion  CARDIOVASCULAR: denies chest pain on exertion, denies palpitations or pedal edema  GI: denies abdominal pain.  No N/V/D/C  MUSCULOSKELETAL: no acute joint or muscle pain  NEURO: denies headaches  PSYCH: denies change in behavior or mood, denies feeling sad or depressed    EXAM:  /82   Pulse 84   Resp 16   Ht 5' 4.5\" (1.638 m)   Wt 226 lb (102.5 kg)   LMP 05/10/2025 (Approximate)   SpO2 97%   BMI 38.19 kg/m²    GENERAL: well developed, well nourished, in no apparent distress, obese  EYES: conjunctiva pink, sclera non icteric, PERRLA  LUNGS: CTA in all fields, breathing non labored  CARDIO: RRR without murmur, normal S1 and S2 without clicks or gallops, no pedal edema.  GI: +BS, soft, non tender  NEURO/MS: motor and sensory grossly intact  PSYCH: pleasant, cooperative, normal mood and affect    ASSESSMENT AND PLAN:  Reviewed Initial Weight Data and Goal Weight Loss:       Encounter Diagnoses   Name Primary?    Encounter for therapeutic drug monitoring Yes    Class 2 severe obesity with serious comorbidity and body mass index (BMI) of 38.0 to 38.9 in adult, unspecified obesity type (HCC)     Prediabetes     History of morbid obesity          No orders of the defined types were placed in this encounter.      Meds & Refills for this  Visit:  Requested Prescriptions     Signed Prescriptions Disp Refills    Tirzepatide-Weight Management (ZEPBOUND) 15 MG/0.5ML Subcutaneous Solution Auto-injector 2 mL 5     Sig: Inject 15 mg into the skin once a week.    buPROPion  MG Oral Tablet 24 Hr 90 tablet 1     Sig: Take 1 tablet (300 mg total) by mouth every morning.    naltrexone 50 MG Oral Tab 90 tablet 1     Sig: Take 1 tablet (50 mg total) by mouth daily. For cravings       Imaging & Consults:  DIETITIAN EDUCATION INITIAL, DIET (INTERNAL)      Plan:  Patient has lost 6# since LOV in 8/2024 on Zepbound 15 mg weekly, Naltrexone 25 mg daily, Bupropion  mg daily with a total weight loss of 70# since initial consult on 7/13/22 with initial weight of 296#. Weight loss goal: improve health and increase energy. Reduce risk for diabetes.  CPM, aside from increase Nal/Bup XL. Hx of Wegovy with GI SEs.  on strength training and metabolic adaptation. See patient instructions below for additional plans and patient counseling.      Patient Instructions   Continue making lifestyle changes that focus on good nutrition, regular exercise and stress management.    Medication Plan: Continue Zepbound at full maintenance dose of 15 mg weekly. Increase Bupropion XL to 300 mg daily and Naltrexone to 50 mg daily. This will mimic full dose Contrave.     Tips while taking an injectable medication:    Be an intuitive eater. Listen to your hunger and fullness signals, stopping when you are full.  Consume protein and produce in your day, striving for a rainbow of color of produce.  Reduce portions to staring size of 1 cup size and check in with your gut to see if you are full. Set the timer to slow down your eating pace to allow for 15-20 minutes to complete a meal. Recommend following the \"2 bite rule\".  Reduce refined sugars and high fat foods, as they may contribute to greater side effects of nausea and heartburn.  Stop eating 3 hours before bedtime to allow  your food to digest.  Remain hydrated with water or non caloric and non caffeine beverages.  Use over the counter aliya lozenge/supplement to help reduce nausea if needed.  If you have been off your medication for more than 2 weeks please notify our office to determine next dosing, as return to previous dose may not be appropriate or tolerated.  Zepbound can be kept at room temperature for up to 3 weeks.      Next steps to work on before next office visit include: Great work! Consider follow up with dietician Lizabeth. Reviewed option for [facility]. Begin to add resistance/strength training with goal of 3x/week for 30-60 minute sessions.    Build Muscle & Lose Fat  The great fat vs muscle diagram below paints a clear picture of why it’s so important for you to build muscle in order to lose fat.  Maybe you’ve wondered about muscle vs fat and why you need to build muscle to lose fat, look slim and keep the inches off.  Well, look no further! With the fat vs muscle diagram below you’ll see why healthy permanent weight loss requires you to build muscle to lose fat.  Fat vs Muscle Diagram  The facts are clear. The best way to lose fat and look slimmer is to build muscle. Since one picture’s worth a thousand words, here’s 5 lbs of muscle vs fat of the same weight. Notice 5 lbs of fat is three times bigger.    This means that if you were to build 5 lbs of muscle and lose 5 lbs of fat, you would weigh exactly the same, but look smaller and firmer.  So imagine if it were 25 lbs or 50 lbs of lost fat vs muscle gained.  This is why it’s possible for you to lose fat inches when exercising, yet show no change in scale weight. And can you see how firm the muscle looks compared to the lumpy, tapioca pudding consistency of fat?  Build Muscle to Lose Fat Benefits  Although daily physical activity, like walking, swimming and aerobics are essential to good heart health and weight management, combining muscle building  weight training with cardiovascular exercise, gives you an unbeatable combination to lose fat and keep it off permanently.  Of course it takes a few weeks before you see any measurable changes. But you’ll start to build muscle, lose fat and burn more calories from the moment you begin weight training. Building muscle helps you:  1. Burn more calories. Unlike fat, muscle beefs up your metabolism to help you burn about 70% more calories than fat can.  2. Improve appearance. When done properly, strength training can greatly improve your posture and help to prevent joint pain.  3. Build confidence. Strong muscles and joints increase your level of confidence in your abilities to perform many lifestyle activities.  4. Prevent injury. Strength training can build stronger muscles and more limber, flexible joints, which play a crucial role in preventing injury.  5. Increase bone density. Weight bearing activities improve your bone density and reduce bone loss. This helps to prevent osteoporosis.  Studies show that weight training combined with aerobics and stretching is the best way to build a strong, firm body and keep it that way.  So, if you want to look and feel better than ever, you need to build muscle to lose fat.     Taken from www.[employer]x by Valerio Lockett      Is Metabolic Adaptation Preventing Me from Losing Weight?      “Losing weight is a constant resendiz”. This is what most of my patients say.    Despite all the diets, medications and strictly following the advice by experts, weight loss is elusive. People do lose weight only to put it back on. Why is that? In this article, I will discuss metabolic adaptation.    Some people resign to the fact that “slow metabolism” runs in their family. The feel they are now condemned to live off lettuce to prevent weight regain while others can indulge in lush food and never gain a pound. I also hear people say that they destroyed their metabolism when they went on  a diet. They usually blame the following diets    Very low calorie diet  Low Carb diet  Atkins diet  High protein diet    People feel they have messed up with their metabolism and metabolism is so slow that no matter how little they eat, they struggle to lose weight.    Does metabolic adaptation actually exist? we will discuss this after understanding some basic facts about our metabolism       How does metabolism work?  To explain what happens when we diet, it is worth first talking about our metabolism. There are three ways our body uses energy.    Firstly, there is the energy that we use to keep our body alive and functioning properly. This constitutes around 65-80% of all the energy we burn. Energy is used to keep all the vital organs in our body functioning.    Secondly, our bodies also use energy to digest our food and this constitutes 10% of all the energy we use.    Lastly, there is the energy that we use when we move around or exercise, accounting for 10-30% of total energy.    As you can see, exercise or any activity normally uses up to 30% of the total energy intake.    The speed of our metabolism or in other words our metabolic rate varies from person to person. Metabolic rate depends on several factors. These factors include our sex (women tend to burn fewer calories than men), age, level of fitness, amount of muscle mass, weight (heavier people generally have higher metabolic rates to meet the need of their bigger bodies) and of course the underlying genetics.       Energy balance  To maintain weight, we need to provide energy at a level our body uses it. This means that if we eat more energy than your body needs, we will gain weight and conversely if we eat less than our body’s demands, we will lose weight. This however is not a straight line. There are several factors in addition to energy in/energy out theory which accounts for weight loss.    Now, if food is abundant and easily available, we will  use the energy that comes from it, to ensure our body functions properly. If, on the other hand, the food is limited and our body receives less than it needs, it will be forced to become very efficient and use up as little of the available energy as possible to support our body’s functions. Scarcity of food would lead to storing energy as fat so as to be used at later time.    This is a survival mechanism often referred to, as metabolic adaptation. Metabolic adaptation means the ability of our bodies to adjust its metabolism according to what we eat. Metabolic adaptation is, therefore, our body’s natural response to starvation, which in our society most often comes in the form of dieting. When we diet and lose weight our energy requirements go down.    The following changes can happen when we diet:    The energy used to digest our food goes down because we eat less  The energy used to maintain our bodily functions decreases because our body becomes smaller  We burn fewer calories during exercise because we weigh less    Eating less means we feel more hungry and are more prone to food cravings and are at bigger risk of giving up on dieting.       Metabolic adaptation and weight loss  Let us go through a scenario, which most of you are familiar with.    Haley needs 2000kcal every day to maintain her current weight. She eats that amount consistently and stay the same size. Then one day she decides to lose weight. She cuts her calorie intake down to 1600kcal. The number on the scales starts going down for a while. Then all of a sudden, she  stops losing weight and maintains her new weight, despite still eating as little as 1600kcal.    Why is that?    Her body adapted to the smaller supply of calories to prevent further weight loss and starvation. This means that 1600kcal is her new maintenance level of calories, which will keep our weight stable. That is her new Basal Metabolic Rate(BMR). In order to continue losing  weight she would have to either cut more calories or increase her physical activity. At some point, this may become impossible as her calorie intake is already low and it is difficult to sustain such a low-calorie diet without feeling hungry or run-down. Metabolic adaptation ensures she gains weight so as to ensure she is able to function well, physically and mentally.       What is the standard advice for weight loss?  Please see below the standard advice provided by nutritionist for weight loss.    1. Up your protein intake. Not only will protein help you keep your muscle untouched during your weight loss and will make you feel full for longer, but it will also use up more energy to be digested than either carbohydrates or fats.    2. Eat your 5 a day. Remember to have plenty of fruits and vegetables throughout the day. These contain fibre and are low in calories but high in volume so will keep you satiated for long    3. Choose healthy carbohydrates. These will give you the energy to stay active    4. Adjust your intake if your weight plateaus. You will need to lower your calorie intake further to continue to lose weight so be prepared to adjust your portions    5. Stay consistent. It can be challenging to always watch your intake and occasional slip on the way will happen. However, as long as you get back on track straight away, it shouldn’t jeopardise your progress.    6. Stay active. A combination of resistance training and cardio would be best but any activity is better than no activity at all. You can introduce small changes at the beginning. Stand when you commute, take the stair rather than a lift, get off one stop too early and walk.    7. If you struggle to estimate the amount you eat, you can start tracking your intake to gain an idea of what you get with your food.    8. Be patient. Weight loss should be slow and steady, and calorie restriction possible to stick to. Therefore, it is best to take small  steps and aim for weight loss of approximately 0.5-1% of your body weight per week.       Science behind metabolic adaptation?  Restrictive diets leads to successful short-term weight loss, however  most people regain the lost weight.    Physiological compensatory changes and adaptations results in overeating. During metabolic adaptation, there is  decrease in energy expenditure, fat oxidation and changes in anorexigenic hormones levels(Leptin).    In addition, there is increase in appetite, craving, increase in hunger hormone( ghrelin) and other complex psychological and neurological changes in the brain.       What is the solution to prevent weight regain?  We are different genetically, physiologically and we all I’ve in a different environment. Solution should be individualised. this is why, no diet works for every one. In the last 40 years no country in [place] has shown that their obesity prevalence has come down.    Individual assessment by a specialist and treating the underlying problem is the key to long term solution.      Posted on [email or web address] on August 12, 2021     Author    Dr VANIA REED();MSc  Consultant Physician  Obesity, Diabetes & Endocrinology           Medication use and SEs reviewed with patient.    Return in about 3 months (around 8/14/2025) for as scheduled and in clinic in 1/2026.      Answers submitted by the patient for this visit:  Medical Weight Loss Follow Up (Submitted on 5/14/2025)  If greater than 5/1O how would you grade your coping mechanisms?: moderate

## 2025-05-14 ENCOUNTER — OFFICE VISIT (OUTPATIENT)
Dept: INTERNAL MEDICINE CLINIC | Facility: CLINIC | Age: 35
End: 2025-05-14
Payer: COMMERCIAL

## 2025-05-14 VITALS
OXYGEN SATURATION: 97 % | HEART RATE: 84 BPM | RESPIRATION RATE: 16 BRPM | BODY MASS INDEX: 38.11 KG/M2 | HEIGHT: 64.5 IN | SYSTOLIC BLOOD PRESSURE: 120 MMHG | WEIGHT: 226 LBS | DIASTOLIC BLOOD PRESSURE: 82 MMHG

## 2025-05-14 DIAGNOSIS — Z51.81 ENCOUNTER FOR THERAPEUTIC DRUG MONITORING: Primary | ICD-10-CM

## 2025-05-14 DIAGNOSIS — E66.01 CLASS 2 SEVERE OBESITY WITH SERIOUS COMORBIDITY AND BODY MASS INDEX (BMI) OF 38.0 TO 38.9 IN ADULT, UNSPECIFIED OBESITY TYPE (HCC): ICD-10-CM

## 2025-05-14 DIAGNOSIS — R73.03 PREDIABETES: ICD-10-CM

## 2025-05-14 DIAGNOSIS — E66.812 CLASS 2 SEVERE OBESITY WITH SERIOUS COMORBIDITY AND BODY MASS INDEX (BMI) OF 38.0 TO 38.9 IN ADULT, UNSPECIFIED OBESITY TYPE (HCC): ICD-10-CM

## 2025-05-14 DIAGNOSIS — Z86.39 HISTORY OF MORBID OBESITY: ICD-10-CM

## 2025-05-14 PROCEDURE — 99213 OFFICE O/P EST LOW 20 MIN: CPT | Performed by: NURSE PRACTITIONER

## 2025-05-14 RX ORDER — TIRZEPATIDE 15 MG/.5ML
15 INJECTION, SOLUTION SUBCUTANEOUS WEEKLY
Qty: 2 ML | Refills: 5 | Status: SHIPPED | OUTPATIENT
Start: 2025-05-14

## 2025-05-14 RX ORDER — BUPROPION HYDROCHLORIDE 300 MG/1
300 TABLET ORAL EVERY MORNING
Qty: 90 TABLET | Refills: 1 | Status: SHIPPED | OUTPATIENT
Start: 2025-05-14 | End: 2025-05-19

## 2025-05-14 RX ORDER — NALTREXONE HYDROCHLORIDE 50 MG/1
50 TABLET, FILM COATED ORAL DAILY
Qty: 90 TABLET | Refills: 1 | Status: SHIPPED | OUTPATIENT
Start: 2025-05-14 | End: 2025-05-19

## 2025-05-14 NOTE — PATIENT INSTRUCTIONS
Continue making lifestyle changes that focus on good nutrition, regular exercise and stress management.    Medication Plan: Continue Zepbound at full maintenance dose of 15 mg weekly. Increase Bupropion XL to 300 mg daily and Naltrexone to 50 mg daily. This will mimic full dose Contrave.     Tips while taking an injectable medication:    Be an intuitive eater. Listen to your hunger and fullness signals, stopping when you are full.  Consume protein and produce in your day, striving for a rainbow of color of produce.  Reduce portions to staring size of 1 cup size and check in with your gut to see if you are full. Set the timer to slow down your eating pace to allow for 15-20 minutes to complete a meal. Recommend following the \"2 bite rule\".  Reduce refined sugars and high fat foods, as they may contribute to greater side effects of nausea and heartburn.  Stop eating 3 hours before bedtime to allow your food to digest.  Remain hydrated with water or non caloric and non caffeine beverages.  Use over the counter aliya lozenge/supplement to help reduce nausea if needed.  If you have been off your medication for more than 2 weeks please notify our office to determine next dosing, as return to previous dose may not be appropriate or tolerated.  Zepbound can be kept at room temperature for up to 3 weeks.      Next steps to work on before next office visit include: Great work! Consider follow up with dietician Lizabeth. Reviewed option for [facility]. Begin to add resistance/strength training with goal of 3x/week for 30-60 minute sessions.    Build Muscle & Lose Fat  The great fat vs muscle diagram below paints a clear picture of why it’s so important for you to build muscle in order to lose fat.  Maybe you’ve wondered about muscle vs fat and why you need to build muscle to lose fat, look slim and keep the inches off.  Well, look no further! With the fat vs muscle diagram below you’ll see why healthy permanent  weight loss requires you to build muscle to lose fat.  Fat vs Muscle Diagram  The facts are clear. The best way to lose fat and look slimmer is to build muscle. Since one picture’s worth a thousand words, here’s 5 lbs of muscle vs fat of the same weight. Notice 5 lbs of fat is three times bigger.    This means that if you were to build 5 lbs of muscle and lose 5 lbs of fat, you would weigh exactly the same, but look smaller and firmer.  So imagine if it were 25 lbs or 50 lbs of lost fat vs muscle gained.  This is why it’s possible for you to lose fat inches when exercising, yet show no change in scale weight. And can you see how firm the muscle looks compared to the lumpy, tapioca pudding consistency of fat?  Build Muscle to Lose Fat Benefits  Although daily physical activity, like walking, swimming and aerobics are essential to good heart health and weight management, combining muscle building weight training with cardiovascular exercise, gives you an unbeatable combination to lose fat and keep it off permanently.  Of course it takes a few weeks before you see any measurable changes. But you’ll start to build muscle, lose fat and burn more calories from the moment you begin weight training. Building muscle helps you:  1. Burn more calories. Unlike fat, muscle beefs up your metabolism to help you burn about 70% more calories than fat can.  2. Improve appearance. When done properly, strength training can greatly improve your posture and help to prevent joint pain.  3. Build confidence. Strong muscles and joints increase your level of confidence in your abilities to perform many lifestyle activities.  4. Prevent injury. Strength training can build stronger muscles and more limber, flexible joints, which play a crucial role in preventing injury.  5. Increase bone density. Weight bearing activities improve your bone density and reduce bone loss. This helps to prevent osteoporosis.  Studies show that weight training  combined with aerobics and stretching is the best way to build a strong, firm body and keep it that way.  So, if you want to look and feel better than ever, you need to build muscle to lose fat.     Taken from [email or web address] by Valerio Lockett      Is Metabolic Adaptation Preventing Me from Losing Weight?      “Losing weight is a constant resendiz”. This is what most of my patients say.    Despite all the diets, medications and strictly following the advice by experts, weight loss is elusive. People do lose weight only to put it back on. Why is that? In this article, I will discuss metabolic adaptation.    Some people resign to the fact that “slow metabolism” runs in their family. The feel they are now condemned to live off lettuce to prevent weight regain while others can indulge in lush food and never gain a pound. I also hear people say that they destroyed their metabolism when they went on a diet. They usually blame the following diets    Very low calorie diet  Low Carb diet  Atkins diet  High protein diet    People feel they have messed up with their metabolism and metabolism is so slow that no matter how little they eat, they struggle to lose weight.    Does metabolic adaptation actually exist? we will discuss this after understanding some basic facts about our metabolism       How does metabolism work?  To explain what happens when we diet, it is worth first talking about our metabolism. There are three ways our body uses energy.    Firstly, there is the energy that we use to keep our body alive and functioning properly. This constitutes around 65-80% of all the energy we burn. Energy is used to keep all the vital organs in our body functioning.    Secondly, our bodies also use energy to digest our food and this constitutes 10% of all the energy we use.    Lastly, there is the energy that we use when we move around or exercise, accounting for 10-30% of total energy.    As you can see, exercise or any  activity normally uses up to 30% of the total energy intake.    The speed of our metabolism or in other words our metabolic rate varies from person to person. Metabolic rate depends on several factors. These factors include our sex (women tend to burn fewer calories than men), age, level of fitness, amount of muscle mass, weight (heavier people generally have higher metabolic rates to meet the need of their bigger bodies) and of course the underlying genetics.       Energy balance  To maintain weight, we need to provide energy at a level our body uses it. This means that if we eat more energy than your body needs, we will gain weight and conversely if we eat less than our body’s demands, we will lose weight. This however is not a straight line. There are several factors in addition to energy in/energy out theory which accounts for weight loss.    Now, if food is abundant and easily available, we will use the energy that comes from it, to ensure our body functions properly. If, on the other hand, the food is limited and our body receives less than it needs, it will be forced to become very efficient and use up as little of the available energy as possible to support our body’s functions. Scarcity of food would lead to storing energy as fat so as to be used at later time.    This is a survival mechanism often referred to, as metabolic adaptation. Metabolic adaptation means the ability of our bodies to adjust its metabolism according to what we eat. Metabolic adaptation is, therefore, our body’s natural response to starvation, which in our society most often comes in the form of dieting. When we diet and lose weight our energy requirements go down.    The following changes can happen when we diet:    The energy used to digest our food goes down because we eat less  The energy used to maintain our bodily functions decreases because our body becomes smaller  We burn fewer calories during exercise because we weigh  less    Eating less means we feel more hungry and are more prone to food cravings and are at bigger risk of giving up on dieting.       Metabolic adaptation and weight loss  Let us go through a scenario, which most of you are familiar with.    Haley needs 2000kcal every day to maintain her current weight. She eats that amount consistently and stay the same size. Then one day she decides to lose weight. She cuts her calorie intake down to 1600kcal. The number on the scales starts going down for a while. Then all of a sudden, she  stops losing weight and maintains her new weight, despite still eating as little as 1600kcal.    Why is that?    Her body adapted to the smaller supply of calories to prevent further weight loss and starvation. This means that 1600kcal is her new maintenance level of calories, which will keep our weight stable. That is her new Basal Metabolic Rate(BMR). In order to continue losing weight she would have to either cut more calories or increase her physical activity. At some point, this may become impossible as her calorie intake is already low and it is difficult to sustain such a low-calorie diet without feeling hungry or run-down. Metabolic adaptation ensures she gains weight so as to ensure she is able to function well, physically and mentally.       What is the standard advice for weight loss?  Please see below the standard advice provided by nutritionist for weight loss.    1. Up your protein intake. Not only will protein help you keep your muscle untouched during your weight loss and will make you feel full for longer, but it will also use up more energy to be digested than either carbohydrates or fats.    2. Eat your 5 a day. Remember to have plenty of fruits and vegetables throughout the day. These contain fibre and are low in calories but high in volume so will keep you satiated for long    3. Choose healthy carbohydrates. These will give you the energy to stay active    4. Adjust  your intake if your weight plateaus. You will need to lower your calorie intake further to continue to lose weight so be prepared to adjust your portions    5. Stay consistent. It can be challenging to always watch your intake and occasional slip on the way will happen. However, as long as you get back on track straight away, it shouldn’t jeopardise your progress.    6. Stay active. A combination of resistance training and cardio would be best but any activity is better than no activity at all. You can introduce small changes at the beginning. Stand when you commute, take the stair rather than a lift, get off one stop too early and walk.    7. If you struggle to estimate the amount you eat, you can start tracking your intake to gain an idea of what you get with your food.    8. Be patient. Weight loss should be slow and steady, and calorie restriction possible to stick to. Therefore, it is best to take small steps and aim for weight loss of approximately 0.5-1% of your body weight per week.       Science behind metabolic adaptation?  Restrictive diets leads to successful short-term weight loss, however  most people regain the lost weight.    Physiological compensatory changes and adaptations results in overeating. During metabolic adaptation, there is  decrease in energy expenditure, fat oxidation and changes in anorexigenic hormones levels(Leptin).    In addition, there is increase in appetite, craving, increase in hunger hormone( ghrelin) and other complex psychological and neurological changes in the brain.       What is the solution to prevent weight regain?  We are different genetically, physiologically and we all I’ve in a different environment. Solution should be individualised. this is why, no diet works for every one. In the last 40 years no country in [place] has shown that their obesity prevalence has come down.    Individual assessment by a specialist and treating the underlying problem is the key to  long term solution.      Posted on [email or web address] on August 12, 2021     Author    Dr VANIA REED([place]);MSc  Consultant Physician  Obesity, Diabetes & Endocrinology

## 2025-05-18 ENCOUNTER — PATIENT MESSAGE (OUTPATIENT)
Dept: INTERNAL MEDICINE CLINIC | Facility: CLINIC | Age: 35
End: 2025-05-18

## 2025-05-18 DIAGNOSIS — Z86.39 HISTORY OF MORBID OBESITY: ICD-10-CM

## 2025-05-18 DIAGNOSIS — E66.01 CLASS 2 SEVERE OBESITY WITH SERIOUS COMORBIDITY AND BODY MASS INDEX (BMI) OF 38.0 TO 38.9 IN ADULT, UNSPECIFIED OBESITY TYPE (HCC): ICD-10-CM

## 2025-05-18 DIAGNOSIS — E66.812 CLASS 2 SEVERE OBESITY WITH SERIOUS COMORBIDITY AND BODY MASS INDEX (BMI) OF 38.0 TO 38.9 IN ADULT, UNSPECIFIED OBESITY TYPE (HCC): ICD-10-CM

## 2025-05-18 DIAGNOSIS — Z51.81 ENCOUNTER FOR THERAPEUTIC DRUG MONITORING: ICD-10-CM

## 2025-05-19 ENCOUNTER — OFFICE VISIT (OUTPATIENT)
Dept: ALLERGY | Facility: CLINIC | Age: 35
End: 2025-05-19
Payer: COMMERCIAL

## 2025-05-19 ENCOUNTER — NURSE ONLY (OUTPATIENT)
Dept: ALLERGY | Facility: CLINIC | Age: 35
End: 2025-05-19

## 2025-05-19 DIAGNOSIS — J30.89 SEASONAL AND PERENNIAL ALLERGIC RHINOCONJUNCTIVITIS: Primary | ICD-10-CM

## 2025-05-19 DIAGNOSIS — Z23 NEED FOR COVID-19 VACCINE: ICD-10-CM

## 2025-05-19 DIAGNOSIS — Z23 FLU VACCINE NEED: ICD-10-CM

## 2025-05-19 DIAGNOSIS — H10.10 SEASONAL AND PERENNIAL ALLERGIC RHINOCONJUNCTIVITIS: Primary | ICD-10-CM

## 2025-05-19 DIAGNOSIS — J30.2 SEASONAL AND PERENNIAL ALLERGIC RHINOCONJUNCTIVITIS: Primary | ICD-10-CM

## 2025-05-19 DIAGNOSIS — J30.89 ENVIRONMENTAL AND SEASONAL ALLERGIES: Primary | ICD-10-CM

## 2025-05-19 DIAGNOSIS — Z88.0 PENICILLIN ALLERGY: ICD-10-CM

## 2025-05-19 PROCEDURE — 99204 OFFICE O/P NEW MOD 45 MIN: CPT | Performed by: ALLERGY & IMMUNOLOGY

## 2025-05-19 PROCEDURE — 95004 PERQ TESTS W/ALRGNC XTRCS: CPT | Performed by: ALLERGY & IMMUNOLOGY

## 2025-05-19 PROCEDURE — 95024 IQ TESTS W/ALLERGENIC XTRCS: CPT | Performed by: ALLERGY & IMMUNOLOGY

## 2025-05-19 RX ORDER — NALTREXONE HYDROCHLORIDE 50 MG/1
50 TABLET, FILM COATED ORAL DAILY
Qty: 90 TABLET | Refills: 1 | Status: SHIPPED | OUTPATIENT
Start: 2025-05-19

## 2025-05-19 RX ORDER — LEVOCETIRIZINE DIHYDROCHLORIDE 5 MG/1
5 TABLET, FILM COATED ORAL EVERY EVENING
Qty: 90 TABLET | Refills: 1 | Status: SHIPPED | OUTPATIENT
Start: 2025-05-19

## 2025-05-19 RX ORDER — BUPROPION HYDROCHLORIDE 300 MG/1
300 TABLET ORAL EVERY MORNING
Qty: 90 TABLET | Refills: 1 | Status: SHIPPED | OUTPATIENT
Start: 2025-05-19

## 2025-05-19 NOTE — TELEPHONE ENCOUNTER
Zepbound went to [facility]  Bupropion and naltrexone now sent to [facility] for patient (already approved but sent to [facility])

## 2025-05-19 NOTE — PROGRESS NOTES
The following individual(s) verbally consented to be recorded using ambient AI listening technology and understand that they can each withdraw their consent to this listening technology at any point by asking the clinician to turn off or pause the recording:    Patient name: Shannon Wheatley

## 2025-05-19 NOTE — PROGRESS NOTES
Shannon Wheatley is a 34 year old female.    HPI:     Chief Complaint   Patient presents with    Allergies     Seasonal/environmental allergies especially to cats. Symptoms include cough, itchy/watery eyes, runny nose, etc. Allergic reaction to penicillin about 7-8 years ago. Was on day 3-4 of medication. No antihistamines within the last 5 days.      Patient is a 34-year-old female who presents for allergy consultation upon referral of her PCP Dr. Myers with a chief complaint of allergies  Review of records notes allergic rhinitis and history of penicillin allergy  Labs from December 2024 including CBC CMP TSH reviewed  Allergy medication list includes Zyrtec  Immunizations reviewed    Today patient reports  History of Present Illness  Shannon Wheatley is a 34 year old female who presents for allergy evaluation and management.    She has experienced year-round environmental allergies since her early twenties, with symptoms exacerbated by exposure to cats and dogs. Symptoms include itchy, watery eyes, rhinorrhea, cough, and throat irritation, particularly when she forgets to take her medication. Mild wheezing occurs in such instances, but she has never used an inhaler.    Her symptoms are managed with cetirizine and Flonase, which she started using recently due to needing to stop cetirizine temporarily. The combination of these medications has not fully alleviated her symptoms, as she still experiences a persistent cough.    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 Her symptoms are worse when around cats, both at home and at work, and she experiences skin reactions when licked by unfamiliar dogs.  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         HISTORY:  Past Medical History[1]   Past Surgical History[2]   Family History[3]   Social History: Short Social Hx on File[4]     Medications (Active prior to today's visit):  Current Medications[5]    Allergies:  Allergies[6]      ROS:     Allergic/Immuno:  See HPI  Cardiovascular:  Negative for irregular heartbeat/palpitations, chest pain, edema  Constitutional:  Negative night sweats,weight loss, irritability and lethargy  Endocrine:  Negative for cold intolerance, polydipsia and polyphagia  ENMT:  Negative for ear drainage, hearing loss and nasal drainage  Eyes:  Negative for eye discharge and vision loss  Gastrointestinal:  Negative for abdominal pain, diarrhea and vomiting  Genitourinary:  Negative for dysuria and hematuria  Hema/Lymph:  Negative for easy bleeding and easy bruising  Integumentary:  Negative for pruritus and rash  Musculoskeletal:  Negative for joint symptoms  Neurological:  Negative for dizziness, seizures  Psychiatric:  Negative for inappropriate interaction and psychiatric symptoms  Respiratory:  Negative for cough, dyspnea and wheezing      PHYSICAL EXAM:   Constitutional: responsive, no acute distress noted  Head/Face: NC/Atraumatic  Eyes/Vision: conjunctiva and lids are normal extraocular motion is intact   Ears/Audiometry: tympanic membranes are normal bilaterally hearing is grossly intact  Nose/Mouth/Throat: nose and throat are clear mucous membranes are moist   Neck/Thyroid: neck is supple without adenopathy  Lymphatic: no abnormal cervical, supraclavicular or axillary adenopathy is noted  Respiratory: normal to inspection lungs are clear to auscultation bilaterally normal respiratory effort   Cardiovascular: regular rate and rhythm no murmurs, gallups, or rubs  Abdomen: soft non-tender non-distended  Skin/Hair: no unusual rashes present  Extremities: no edema, cyanosis, or clubbing  Neurological:Oriented to time, place, person & situation        ASSESSMENT/PLAN:   Assessment   Encounter Diagnoses   Name Primary?    Seasonal and perennial allergic rhinoconjunctivitis Yes    Penicillin allergy     Flu vaccine need     Need for COVID-19 vaccine        Assessment & Plan  Skin testing today to common indoor and outdoor environmental allergies was  Positive to trees grass weeds and dog dust mite cat mold and negative to the remaining allergens  Positive histamine control    Assessment & Plan  Allergic rhinitis due to animal (cat and dog) dander  Chronic allergic rhinitis due to cat and dog dander, exacerbated by exposure to animals at home and work. Symptoms include itchy, watery eyes, rhinorrhea, cough, and throat irritation. Mild wheezing occurs when medication is missed. Current treatment includes cetirizine and Flonase. Discussed potential improvement with combined use of Flonase and Xyzal, a stronger and less sedating antihistamine. Considered Singulair for additional control, with caution regarding mood-related side effects. Allergy shots discussed as a future option if symptoms persist despite medication. Management includes avoidance, medication, and immunotherapy.  - Prescribe Xyzal to replace cetirizine.  - Continue Flonase nasal spray daily  Consider Astelin as an antihistamine spray 2 sprays per nostril twice a day if refractory to above recommendations  - Consider Singulair if symptoms persist with current regimen.  - Provide information on allergy shots for future consideration.  - Perform allergy testing today.    Seasonal allergic rhinitis  Seasonal allergic rhinitis with symptoms worsening in the summer. Current management includes cetirizine and Flonase. Discussed Xyzal as a potentially more effective antihistamine and the role of Singulair in managing both nasal and mild asthma symptoms. Emphasized consistent use of nasal sprays for optimal effect.  - Prescribe Xyzal to replace cetirizine.  - Continue Flonase nasal spray daily.  - Consider  Singulair if symptoms persist with current regimen.  - Perform allergy testing today.    Penicillin allergy  Reported penicillin allergy with hives occurring 7-8 years ago after 2-3 days of use. Discussed the possibility of outgrowing the allergy, as 50% of individuals outgrow it within 5 years and 80% within 10 years. She is interested in confirming current allergy status.  - Order penicillin and amoxicillin blood tests to assess current allergy status.    Flu vaccine in the fall recommended  Up-to-date through work  COVID-vaccine booster in the fall recommended.        Orders This Visit:  Orders Placed This Encounter   Procedures    Penicillin V    Penicillin G    Allergen, Amoxicillin       Meds This Visit:  Requested Prescriptions     Signed Prescriptions Disp Refills    levocetirizine 5 MG Oral Tab 90 tablet 1     Sig: Take 1 tablet (5 mg total) by mouth every evening.       Imaging & Referrals:  None     5/19/2025  Zack Burns MD      If medication samples were provided today, they were provided solely for patient education and training related to self administration of these medications.  Teaching, instruction and sample was provided to the patient by myself.  Teaching included  a review of potential adverse side effects as well as potential efficacy.  Patient's questions were answered in regards to medication administration and dosing and potential side effects. Teaching was provided via the teach back method         [1]   Past Medical History:   Obesity    Pre-diabetes   [2] History reviewed. No pertinent surgical history.  [3]   Family History  Problem Relation Age of Onset    Other (Prediabetes) Mother         Resolved with bariatric surgery    Obesity Mother     Diabetes Maternal Grandfather     Breast Cancer Neg     Uterine Cancer Neg     Ovarian Cancer Neg     Colon Cancer Neg    [4]   Social History  Socioeconomic History    Marital status: Single   Tobacco Use    Smoking status: Never     Smokeless tobacco: Never   Vaping Use    Vaping status: Never Used   Substance and Sexual Activity    Alcohol use: Yes     Comment: Rarely consume    Drug use: Yes     Types: Cannabis     Comment: Rarely    Sexual activity: Never   [5]   Current Outpatient Medications   Medication Sig Dispense Refill    naltrexone 50 MG Oral Tab Take 1 tablet (50 mg total) by mouth daily. For cravings 90 tablet 1    buPROPion  MG Oral Tablet 24 Hr Take 1 tablet (300 mg total) by mouth every morning. 90 tablet 1    levocetirizine 5 MG Oral Tab Take 1 tablet (5 mg total) by mouth every evening. 90 tablet 1    Tirzepatide-Weight Management (ZEPBOUND) 15 MG/0.5ML Subcutaneous Solution Auto-injector Inject 15 mg into the skin once a week. 2 mL 5    Ferrous Sulfate Dried (HIGH POTENCY IRON) 65 MG Oral Tab       Cholecalciferol 125 MCG (5000 UT) Oral Tab       cyanocobalamin 1000 MCG Oral Tab       Magnesium 400 MG Oral Tab       cetirizine (ALLERGY, CETIRIZINE,) 10 MG Oral Tab  (Patient not taking: Reported on 5/19/2025)     [6]   Allergies  Allergen Reactions    Penicillins HIVES

## 2025-08-13 ENCOUNTER — TELEMEDICINE (OUTPATIENT)
Dept: INTERNAL MEDICINE CLINIC | Facility: CLINIC | Age: 35
End: 2025-08-13

## 2025-08-13 DIAGNOSIS — Z86.39 HISTORY OF MORBID OBESITY: ICD-10-CM

## 2025-08-13 DIAGNOSIS — E66.812 CLASS 2 SEVERE OBESITY WITH SERIOUS COMORBIDITY AND BODY MASS INDEX (BMI) OF 38.0 TO 38.9 IN ADULT, UNSPECIFIED OBESITY TYPE (HCC): ICD-10-CM

## 2025-08-13 DIAGNOSIS — Z51.81 ENCOUNTER FOR THERAPEUTIC DRUG MONITORING: Primary | ICD-10-CM

## 2025-08-13 DIAGNOSIS — R73.03 PREDIABETES: ICD-10-CM

## 2025-08-13 DIAGNOSIS — E66.01 CLASS 2 SEVERE OBESITY WITH SERIOUS COMORBIDITY AND BODY MASS INDEX (BMI) OF 38.0 TO 38.9 IN ADULT, UNSPECIFIED OBESITY TYPE (HCC): ICD-10-CM

## 2025-08-13 PROCEDURE — 98005 SYNCH AUDIO-VIDEO EST LOW 20: CPT | Performed by: NURSE PRACTITIONER
